# Patient Record
Sex: MALE | Race: WHITE | Employment: OTHER | URBAN - METROPOLITAN AREA
[De-identification: names, ages, dates, MRNs, and addresses within clinical notes are randomized per-mention and may not be internally consistent; named-entity substitution may affect disease eponyms.]

---

## 2017-04-06 ENCOUNTER — OFFICE VISIT (OUTPATIENT)
Dept: ORTHOPEDIC SURGERY | Age: 67
End: 2017-04-06

## 2017-04-06 VITALS
HEIGHT: 71 IN | WEIGHT: 265 LBS | DIASTOLIC BLOOD PRESSURE: 78 MMHG | BODY MASS INDEX: 37.1 KG/M2 | SYSTOLIC BLOOD PRESSURE: 123 MMHG | HEART RATE: 72 BPM

## 2017-04-06 DIAGNOSIS — M12.562 TRAUMATIC ARTHROPATHY OF LEFT KNEE: Primary | ICD-10-CM

## 2017-04-06 DIAGNOSIS — M12.561 TRAUMATIC ARTHROPATHY OF RIGHT KNEE: ICD-10-CM

## 2017-04-06 PROCEDURE — 99213 OFFICE O/P EST LOW 20 MIN: CPT | Performed by: PHYSICIAN ASSISTANT

## 2017-05-18 RX ORDER — NAPROXEN 500 MG/1
TABLET ORAL
Qty: 60 TABLET | Refills: 5 | Status: SHIPPED | OUTPATIENT
Start: 2017-05-18 | End: 2017-11-06 | Stop reason: SDUPTHER

## 2017-06-08 ENCOUNTER — HOSPITAL ENCOUNTER (OUTPATIENT)
Dept: ULTRASOUND IMAGING | Age: 67
Discharge: HOME | End: 2017-06-08
Attending: NURSE PRACTITIONER | Admitting: NURSE PRACTITIONER

## 2017-06-08 DIAGNOSIS — N50.89 TESTICLE SWELLING: ICD-10-CM

## 2017-06-08 DIAGNOSIS — N50.89 OTHER SPECIFIED DISORDERS OF MALE GENITAL ORGANS: ICD-10-CM

## 2017-09-28 ENCOUNTER — OFFICE VISIT (OUTPATIENT)
Dept: ORTHOPEDIC SURGERY | Age: 67
End: 2017-09-28

## 2017-09-28 VITALS
BODY MASS INDEX: 37.1 KG/M2 | DIASTOLIC BLOOD PRESSURE: 79 MMHG | HEART RATE: 73 BPM | WEIGHT: 265 LBS | HEIGHT: 71 IN | SYSTOLIC BLOOD PRESSURE: 122 MMHG | TEMPERATURE: 97.5 F

## 2017-09-28 DIAGNOSIS — M12.561 TRAUMATIC ARTHROPATHY OF RIGHT KNEE: Primary | ICD-10-CM

## 2017-09-28 DIAGNOSIS — M12.562 TRAUMATIC ARTHROPATHY OF LEFT KNEE: ICD-10-CM

## 2017-09-28 PROCEDURE — 99213 OFFICE O/P EST LOW 20 MIN: CPT | Performed by: ORTHOPAEDIC SURGERY

## 2017-11-06 RX ORDER — NAPROXEN 500 MG/1
TABLET ORAL
Qty: 60 TABLET | Refills: 5 | Status: SHIPPED | OUTPATIENT
Start: 2017-11-06 | End: 2018-03-29 | Stop reason: SDUPTHER

## 2018-03-29 ENCOUNTER — OFFICE VISIT (OUTPATIENT)
Dept: ORTHOPEDIC SURGERY | Age: 68
End: 2018-03-29

## 2018-03-29 VITALS
HEIGHT: 71 IN | WEIGHT: 265 LBS | BODY MASS INDEX: 37.1 KG/M2 | HEART RATE: 68 BPM | SYSTOLIC BLOOD PRESSURE: 132 MMHG | TEMPERATURE: 97.6 F | DIASTOLIC BLOOD PRESSURE: 81 MMHG

## 2018-03-29 DIAGNOSIS — M12.561 TRAUMATIC ARTHROPATHY OF RIGHT KNEE: Primary | ICD-10-CM

## 2018-03-29 DIAGNOSIS — M12.562 TRAUMATIC ARTHROPATHY OF LEFT KNEE: ICD-10-CM

## 2018-03-29 PROCEDURE — 99212 OFFICE O/P EST SF 10 MIN: CPT | Performed by: PHYSICIAN ASSISTANT

## 2018-03-29 RX ORDER — CYCLOBENZAPRINE HCL 10 MG
10 TABLET ORAL EVERY 8 HOURS PRN
Qty: 90 TABLET | Refills: 5 | Status: SHIPPED | OUTPATIENT
Start: 2018-03-29 | End: 2019-02-04 | Stop reason: SDUPTHER

## 2018-03-29 RX ORDER — CEPHALEXIN 500 MG/1
CAPSULE ORAL
Qty: 4 CAPSULE | Refills: 3 | Status: SHIPPED | OUTPATIENT
Start: 2018-03-29 | End: 2019-06-20

## 2018-03-29 RX ORDER — NAPROXEN 500 MG/1
TABLET ORAL
Qty: 60 TABLET | Refills: 5 | Status: SHIPPED | OUTPATIENT
Start: 2018-03-29 | End: 2019-06-20

## 2018-03-30 ENCOUNTER — TELEPHONE (OUTPATIENT)
Dept: ORTHOPEDIC SURGERY | Age: 68
End: 2018-03-30

## 2018-04-23 RX ORDER — NAPROXEN 500 MG/1
TABLET ORAL
Qty: 60 TABLET | Refills: 5 | Status: SHIPPED | OUTPATIENT
Start: 2018-04-23 | End: 2018-10-18 | Stop reason: SDUPTHER

## 2018-05-31 ENCOUNTER — TELEPHONE (OUTPATIENT)
Dept: ORTHOPEDIC SURGERY | Age: 68
End: 2018-05-31

## 2018-10-02 ENCOUNTER — OFFICE VISIT (OUTPATIENT)
Dept: ORTHOPEDIC SURGERY | Age: 68
End: 2018-10-02
Payer: COMMERCIAL

## 2018-10-02 VITALS
SYSTOLIC BLOOD PRESSURE: 132 MMHG | HEART RATE: 67 BPM | BODY MASS INDEX: 38.16 KG/M2 | TEMPERATURE: 98 F | WEIGHT: 272.6 LBS | HEIGHT: 71 IN | DIASTOLIC BLOOD PRESSURE: 80 MMHG

## 2018-10-02 DIAGNOSIS — M12.562 TRAUMATIC ARTHROPATHY OF LEFT KNEE: ICD-10-CM

## 2018-10-02 DIAGNOSIS — M12.561 TRAUMATIC ARTHROPATHY OF RIGHT KNEE: Primary | ICD-10-CM

## 2018-10-02 PROCEDURE — 99212 OFFICE O/P EST SF 10 MIN: CPT | Performed by: PHYSICIAN ASSISTANT

## 2018-10-03 NOTE — PROGRESS NOTES
Partners: Female       Current Outpatient Prescriptions   Medication Sig Dispense Refill    naproxen (NAPROSYN) 500 MG tablet TAKE 1 TABLET BY MOUTH TWICE A DAY WITH FOOD 60 tablet 5    cephALEXin (KEFLEX) 500 MG capsule 2 po 2 hours pre procedure and 2 po 2 hours post procedure 4 capsule 3    naproxen (NAPROSYN) 500 MG tablet TAKE 1 TABLET BY MOUTH TWICE A DAY WITH FOOD 60 tablet 5    cyclobenzaprine (FLEXERIL) 10 MG tablet Take 1 tablet by mouth every 8 hours as needed for Muscle spasms 90 tablet 5    metFORMIN (GLUCOPHAGE) 1000 MG tablet Take 1,000 mg by mouth 2 times daily (with meals)      canagliflozin (INVOKANA) 300 MG TABS tablet Take 300 mg by mouth every morning (before breakfast)      ondansetron (ZOFRAN ODT) 4 MG disintegrating tablet Take 1 tablet by mouth every 8 hours as needed for Nausea 20 tablet 0    allopurinol (ZYLOPRIM) 100 MG tablet Take 100 mg by mouth as needed       FLUoxetine (PROZAC) 20 MG capsule Take 10 mg by mouth daily ? Mg dose      levothyroxine (SYNTHROID) 75 MCG tablet       canagliflozin (INVOKANA) 100 MG TABS tablet Take 100 mg by mouth every morning (before breakfast)      apixaban (ELIQUIS) 5 MG TABS tablet Take 1 tablet by mouth 2 times daily 60 tablet 3    eszopiclone (LUNESTA) 3 MG TABS   1    levothyroxine (SYNTHROID) 25 MCG tablet Take 1 tablet by mouth Daily (Patient taking differently: Take 75 mcg by mouth Daily ) 30 tablet 1    metFORMIN (GLUCOPHAGE) 500 MG tablet Take 2 tablets by mouth daily (with breakfast) 60 tablet 2    ALPRAZolam (XANAX) 0.5 MG tablet Take 1 tablet by mouth as needed. 30 tablet 0     No current facility-administered medications for this visit. Objective:     He is alert, oriented x 3, pleasant, well nourished, developed and in no acute distress.     /80   Pulse 67   Temp 98 °F (36.7 °C) (Temporal)   Ht 5' 11\" (1.803 m)   Wt 272 lb 9.6 oz (123.7 kg)   BMI 38.02 kg/m²      KNEE EXAM:  Examination of the bilateral knee shows: There is  no obvious deformity. There is not erythema. There is minimal to no soft tissue swelling. There is no significant joint effusion. AROM-  Extension-     Left 0   Right 0                        Flexion-         Left 115   Right 115  There is no pain associated with ROM testing. Medial joint line is not tender to palpation. Lateral joint line is not tender to palpation. There is not crepitus along the joint line with ROM testing. There is no significant instability with varus or valgus stress testing. Anterior Drawer test is negative for instability. Extensor Mechanism is  intact. X Rays: performed in the office today:   AP, Lateral and Sunrise of the bilateral Knee : There is a bilateral prosthetic total knee arthroplasty present. Right knee arthroplasty is a uncemented arthroplasty. Left knee is a cemented arthroplasty. He may have a little bit of polyethylene wear on the right medial compartment. No ostial lysis or evidence of loosening. Assessment:       ICD-10-CM ICD-9-CM    1. 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 Risks and benefits of the treatment options also reviewed in detail. Continue with a HEP-  A home exercise program was re-instructed today including ROM exercises and strengthening exercises. The patient verbalized understanding of these exercises as well as the importance of the exercise program to promote return of normal function. If pain intensifies or other problems arise you are to notify the office. Prophylactic antibiotics for any surgical or dental procedures.  This is recommended for

## 2018-10-18 RX ORDER — NAPROXEN 500 MG/1
TABLET ORAL
Qty: 60 TABLET | Refills: 5 | Status: SHIPPED | OUTPATIENT
Start: 2018-10-18 | End: 2019-04-17 | Stop reason: SDUPTHER

## 2019-02-04 RX ORDER — CYCLOBENZAPRINE HCL 10 MG
10 TABLET ORAL EVERY 8 HOURS PRN
Qty: 90 TABLET | Refills: 5 | Status: SHIPPED | OUTPATIENT
Start: 2019-02-04 | End: 2019-10-03 | Stop reason: CLARIF

## 2019-04-01 ENCOUNTER — OFFICE VISIT (OUTPATIENT)
Dept: ORTHOPEDIC SURGERY | Age: 69
End: 2019-04-01
Payer: COMMERCIAL

## 2019-04-01 VITALS
SYSTOLIC BLOOD PRESSURE: 149 MMHG | WEIGHT: 280 LBS | BODY MASS INDEX: 39.05 KG/M2 | HEART RATE: 71 BPM | DIASTOLIC BLOOD PRESSURE: 89 MMHG | TEMPERATURE: 98.4 F

## 2019-04-01 DIAGNOSIS — M12.562 TRAUMATIC ARTHROPATHY OF LEFT KNEE: ICD-10-CM

## 2019-04-01 DIAGNOSIS — M12.561 TRAUMATIC ARTHROPATHY OF RIGHT KNEE: Primary | ICD-10-CM

## 2019-04-01 PROCEDURE — 99212 OFFICE O/P EST SF 10 MIN: CPT | Performed by: PHYSICIAN ASSISTANT

## 2019-04-02 NOTE — PROGRESS NOTES
Subjective:      Patient ID: Lacho Cloud is a 79 y.o.  male. Chief Complaint   Patient presents with    Knee Problem     Left TKA     Knee Problem     Right TKA         HPI: He is here for follow-up on his left or right knee. These are [facility] injuries resulting in traumatic arthropathy of the left right knee. Status post bilateral knee arthroplasties. Left knee has 9, right knee 2007. Occasional discomfort times but overall doing well. Currently not working. Review of Systems:   Negative for fever or chills.       Past Medical History:   Diagnosis Date    Acute rhinosinusitis 2013    Anxiety     Arthritis     Chronic back pain     Depression     Diabetes mellitus ([address]     Hernia of unspecified site of abdominal cavity without mention of obstruction or gangrene     Hyperlipidemia     Hypertension     no hx HTN    Hypothyroidism     Kidney stones     Movement disorder     Osteoarthritis     Pneumonia     Type II or unspecified type diabetes mellitus without mention of complication, not stated as uncontrolled     Vitamin D deficiency        Family History   Problem Relation Age of Onset    Cancer Father     Arthritis Other     Cancer Other     Diabetes Other     Heart Disease Other     High Blood Pressure Other        Past Surgical History:   Procedure Laterality Date    CHOLECYSTECTOMY      CYSTOSCOPY  2010    CYSTOSCOPY  11    HERNIA REPAIR      umbilical    JOINT REPLACEMENT      right    JOINT REPLACEMENT      left    LITHOTRIPSY      TOTAL KNEE ARTHROPLASTY      two    URETER STENT PLACEMENT      URETHRAL STRICTURE DILATATION  11       Social History     Occupational History    Occupation: disabled/retired   Tobacco Use    Smoking status: Former Smoker     Packs/day: 1.00     Years: 20.00     Pack years: 20.00     Types: Cigarettes     Last attempt to quit: 2012     Years since quittin.8    Smokeless tobacco: Never Used   [facility] Tobacco comment: quit   Substance and Sexual Activity    Alcohol use: No    Drug use: No    Sexual activity: Yes     Partners: Female       Current Outpatient Medications   Medication Sig Dispense Refill    cyclobenzaprine (FLEXERIL) 10 MG tablet Take 1 tablet by mouth every 8 hours as needed for Muscle spasms 90 tablet 5    naproxen (NAPROSYN) 500 MG tablet TAKE 1 TABLET BY MOUTH TWICE A DAY WITH FOOD 60 tablet 5    cephALEXin (KEFLEX) 500 MG capsule 2 po 2 hours pre procedure and 2 po 2 hours post procedure 4 capsule 3    naproxen (NAPROSYN) 500 MG tablet TAKE 1 TABLET BY MOUTH TWICE A DAY WITH FOOD 60 tablet 5    metFORMIN (GLUCOPHAGE) 1000 MG tablet Take 1,000 mg by mouth 2 times daily (with meals)      canagliflozin (INVOKANA) 300 MG TABS tablet Take 300 mg by mouth every morning (before breakfast)      ondansetron (ZOFRAN ODT) 4 MG disintegrating tablet Take 1 tablet by mouth every 8 hours as needed for Nausea 20 tablet 0    allopurinol (ZYLOPRIM) 100 MG tablet Take 100 mg by mouth as needed       FLUoxetine (PROZAC) 20 MG capsule Take 10 mg by mouth daily ? Mg dose      levothyroxine (SYNTHROID) 75 MCG tablet       canagliflozin (INVOKANA) 100 MG TABS tablet Take 100 mg by mouth every morning (before breakfast)      apixaban (ELIQUIS) 5 MG TABS tablet Take 1 tablet by mouth 2 times daily 60 tablet 3    eszopiclone (LUNESTA) 3 MG TABS   1    levothyroxine (SYNTHROID) 25 MCG tablet Take 1 tablet by mouth Daily (Patient taking differently: Take 75 mcg by mouth Daily ) 30 tablet 1    metFORMIN (GLUCOPHAGE) 500 MG tablet Take 2 tablets by mouth daily (with breakfast) 60 tablet 2    ALPRAZolam (XANAX) 0.5 MG tablet Take 1 tablet by mouth as needed. 30 tablet 0     No current facility-administered medications for this visit. Objective:   He is alert, oriented x 3, pleasant, well nourished, developed and in no acute distress.     BP (!) 149/89   Pulse 71   Temp 98.4 °F (36.9 °C) (Temporal)   Wt 280 lb (127 kg)   BMI 39.05 kg/m²      He has had no significant pain with range of motion testing of the left or right knee. There is no crepitus or significant joint effusion. There is no instability. Left as well as right knee has full extension and about 125° flexion. X Rays: not performed in the office today:     Diagnosis:        ICD-10-CM    1. [facility]: Traumatic arthropathy of right [facility]-1604 M12.561    2. 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 The natural history of the patient's diagnosis as well as the treatment options were discussed in full and questions were answered. Risks and benefits of the treatment options also reviewed in detail. Continue follow-up every 6 months. X-rays will be obtained next visit. Follow Up:   Call or return to clinic prn if these symptoms worsen or fail to improve as anticipated.

## 2019-04-18 ENCOUNTER — OFFICE VISIT (OUTPATIENT)
Dept: ORTHOPEDIC SURGERY | Age: 69
End: 2019-04-18
Payer: MEDICARE

## 2019-04-18 VITALS — HEIGHT: 71 IN | BODY MASS INDEX: 39.2 KG/M2 | RESPIRATION RATE: 16 BRPM | WEIGHT: 280 LBS

## 2019-04-18 DIAGNOSIS — M72.2 BILATERAL PLANTAR FASCIITIS: Primary | ICD-10-CM

## 2019-04-18 PROCEDURE — 99214 OFFICE O/P EST MOD 30 MIN: CPT | Performed by: ORTHOPAEDIC SURGERY

## 2019-04-18 NOTE — PROGRESS NOTES
CHIEF COMPLAINT: Bilateral heel pain/plantar fasciitis. HISTORY:  Mr. Migel Alejandro 79 y.o.  male presents today for the first visit for evaluation of bilateral heel pain which started years ago.  He is complaining of achy  pain. Pain is increase with standing and wallking. Pain is sharp early in the morning with first few steps, dull achy pain by the end of the day. No radiation and no numbness and tingling sensation. No other complaint.       Past Medical History:   Diagnosis Date    Acute rhinosinusitis 11/8/2013    Anxiety     Arthritis     Chronic back pain     Depression     Diabetes mellitus ([facility] [address]     Hernia of unspecified site of abdominal cavity without mention of obstruction or gangrene     Hyperlipidemia     Hypertension     no hx HTN    Hypothyroidism     Kidney stones     Movement disorder     Osteoarthritis     Pneumonia     Type II or unspecified type diabetes mellitus without mention of complication, not stated as uncontrolled     Vitamin D deficiency        Past Surgical History:   Procedure Laterality Date    CHOLECYSTECTOMY      CYSTOSCOPY  04/16/2010    CYSTOSCOPY  03/21/11    HERNIA REPAIR      umbilical    JOINT REPLACEMENT      right    JOINT REPLACEMENT      left    LITHOTRIPSY      TOTAL KNEE ARTHROPLASTY      two    URETER STENT PLACEMENT      URETHRAL STRICTURE DILATATION  03/21/11       Social History     Socioeconomic History    Marital status:      Spouse name: Alma Salcedo Number of children: 3    Years of education: 8    Highest education level: Not on file   Occupational History    Occupation: disabled/retired   Social Needs    Financial resource strain: Not on file    Food insecurity:     Worry: Not on file     Inability: Not on file   [email or web address] needs:     Medical: Not on file     Non-medical: Not on file   Tobacco Use    Smoking status: Former Smoker     Packs/day: 1.00     Years: 20.00     Pack years: 20.00     Types: for Nausea 20 tablet 0    allopurinol (ZYLOPRIM) 100 MG tablet Take 100 mg by mouth as needed       FLUoxetine (PROZAC) 20 MG capsule Take 10 mg by mouth daily ? Mg dose      levothyroxine (SYNTHROID) 75 MCG tablet       canagliflozin (INVOKANA) 100 MG TABS tablet Take 100 mg by mouth every morning (before breakfast)      apixaban (ELIQUIS) 5 MG TABS tablet Take 1 tablet by mouth 2 times daily 60 tablet 3    eszopiclone (LUNESTA) 3 MG TABS   1    levothyroxine (SYNTHROID) 25 MCG tablet Take 1 tablet by mouth Daily (Patient taking differently: Take 75 mcg by mouth Daily ) 30 tablet 1    metFORMIN (GLUCOPHAGE) 500 MG tablet Take 2 tablets by mouth daily (with breakfast) 60 tablet 2    ALPRAZolam (XANAX) 0.5 MG tablet Take 1 tablet by mouth as needed. 30 tablet 0     No current facility-administered medications on file prior to visit. Pertinent items are noted in HPI  Review of systems reviewed from Patient History Form dated on 10/2/2018 and available in the patient's chart under the Media tab. No change. PHYSICAL EXAMINATION:  Mr. Karine Cool is a very pleasant 79 y.o.  male who presents today in no acute distress, awake, alert, and oriented. He is well dressed, nourished and  groomed. Patient with normal affect. Height is  5' 11\" (1.803 m), weight is 280 lb (127 kg), Body mass index is 39.05 kg/m². Resting respiratory rate is 16. Examination of the gait, showed that the patient walks heel-toe with a non-antalgic gait and no limp.  Examination of both ankles showing a good range of motion.  He has dorsiflexion to about 5 degrees bilaterally, which increased with knee flexion.  He has intact sensation and good pedal pulses.  He has good strength in all four planes, including eversion, and has mild tenderness on deep palpation over the medial calcaneal tubercle, compared to the other side.  The ankles are stable to drawer test bilaterally, equally.      IMAGING:Xray's were reviewed.  3 views of the bilateral foot taken in office today, and showed no acute fracture. No other abnormality. IMPRESSION: Bilateral plantar fasciitis. PLAN: I discussed with the patient the treatment options. We recommended stretching exercises of the calf as well as stretching of the plantar fascia which was taught to the patient today. He will take NSAIDS PRN. Use silicone heel pad. F/u in 6 weeks, PT if needed. He understands that this may take up to 6 months for the pain to resolve.         Ernst Vargas MD

## 2019-04-19 ENCOUNTER — TELEPHONE (OUTPATIENT)
Dept: ORTHOPEDIC SURGERY | Age: 69
End: 2019-04-19

## 2019-04-23 RX ORDER — NAPROXEN 500 MG/1
TABLET ORAL
Qty: 60 TABLET | Refills: 5 | Status: SHIPPED | OUTPATIENT
Start: 2019-04-23 | End: 2019-06-20

## 2019-05-28 ENCOUNTER — TELEPHONE (OUTPATIENT)
Dept: ORTHOPEDIC SURGERY | Age: 69
End: 2019-05-28

## 2019-05-28 NOTE — TELEPHONE ENCOUNTER
Patient wife Soraya Jaimie called regarding patient pain. She states that per the Cardiologist patient needs to get control of pain due to blood pressure. Cardiologist does not want patient to take a lot of Naprosyn due to patient heart. She is calling to obtain pain medication for patient. She did mention a possible refill of Gabapentin.     Pharmacy:  [facility]: [phone]    Please call Soraya Etienne:  [phone]

## 2019-05-29 NOTE — TELEPHONE ENCOUNTER
Called and spoke with Dana Ku.   Cailin Trudi will need to come in to discuss pain management options in order to submit a C9 request with [facility]

## 2019-06-04 ENCOUNTER — OFFICE VISIT (OUTPATIENT)
Dept: ORTHOPEDIC SURGERY | Age: 69
End: 2019-06-04
Payer: COMMERCIAL

## 2019-06-04 VITALS
TEMPERATURE: 98 F | SYSTOLIC BLOOD PRESSURE: 123 MMHG | WEIGHT: 280 LBS | HEART RATE: 73 BPM | DIASTOLIC BLOOD PRESSURE: 73 MMHG | HEIGHT: 71 IN | RESPIRATION RATE: 16 BRPM | BODY MASS INDEX: 39.2 KG/M2

## 2019-06-04 DIAGNOSIS — M12.561 TRAUMATIC ARTHROPATHY OF RIGHT KNEE: Primary | ICD-10-CM

## 2019-06-04 DIAGNOSIS — M12.562 TRAUMATIC ARTHROPATHY OF LEFT KNEE: ICD-10-CM

## 2019-06-04 PROCEDURE — 99213 OFFICE O/P EST LOW 20 MIN: CPT | Performed by: PHYSICIAN ASSISTANT

## 2019-06-04 RX ORDER — GABAPENTIN 100 MG/1
100 CAPSULE ORAL 3 TIMES DAILY
Qty: 90 CAPSULE | Refills: 1 | Status: SHIPPED | OUTPATIENT
Start: 2019-06-04 | End: 2019-10-03 | Stop reason: CLARIF

## 2019-06-05 NOTE — PROGRESS NOTES
Subjective:      Patient ID: Suyapa Richey is a 81 y.o.  male. Chief Complaint   Patient presents with    Follow-up     bilateral knees        HPI: He is here for follow-up bilateral knee posttraumatic arthritis status post bilateral knee arthroplasties. He was control in his knee pain with an Naprosyn 1 p.o. q.h.s. He was advised by his cardiologist to discontinue Naprosyn due to his cardiac history. He is here today to discuss options for pain management. He states his symptoms were fairly well controlled a while back while he was on Neurontin. He states he is not sure why that was discontinued. He is here today to discuss what options can be tried for pain management. He is not interested in narcotics at this time. Knee pain, leg pain is a 7-8/10. Knee pain is his primary concern today. Review of Systems:   Negative for fever or chills. Negative for significant numbness and/or tingling in lower extremity.     Past Medical History:   Diagnosis Date    Acute rhinosinusitis 11/8/2013    Anxiety     Arthritis     Chronic back pain     Depression     Diabetes mellitus ([facility] [address]     Hernia of unspecified site of abdominal cavity without mention of obstruction or gangrene     Hyperlipidemia     Hypertension     no hx HTN    Hypothyroidism     Kidney stones     Movement disorder     Osteoarthritis     Pneumonia     Type II or unspecified type diabetes mellitus without mention of complication, not stated as uncontrolled     Vitamin D deficiency        Family History   Problem Relation Age of Onset    Cancer Father     Arthritis Other     Cancer Other     Diabetes Other     Heart Disease Other     High Blood Pressure Other        Past Surgical History:   Procedure Laterality Date    CHOLECYSTECTOMY      CYSTOSCOPY  04/16/2010    CYSTOSCOPY  03/21/11    HERNIA REPAIR      umbilical    JOINT REPLACEMENT      right    JOINT REPLACEMENT      left    LITHOTRIPSY      TOTAL KNEE ARTHROPLASTY      two    URETER STENT PLACEMENT      URETHRAL STRICTURE DILATATION  11       Social History     Occupational History    Occupation: disabled/retired   Tobacco Use    Smoking status: Former Smoker     Packs/day: 1.00     Years: 20.00     Pack years: 20.00     Types: Cigarettes     Last attempt to quit: 2012     Years since quittin.0    Smokeless tobacco: Never Used    Tobacco comment: quit   Substance and Sexual Activity    Alcohol use: No    Drug use: No    Sexual activity: Yes     Partners: Female       Current Outpatient Medications   Medication Sig Dispense Refill    gabapentin (NEURONTIN) 100 MG capsule Take 1 capsule by mouth 3 times daily for 30 days. 90 capsule 1    naproxen (NAPROSYN) 500 MG tablet TAKE 1 TABLET BY MOUTH TWICE A DAY WITH FOOD 60 tablet 5    cyclobenzaprine (FLEXERIL) 10 MG tablet Take 1 tablet by mouth every 8 hours as needed for Muscle spasms 90 tablet 5    cephALEXin (KEFLEX) 500 MG capsule 2 po 2 hours pre procedure and 2 po 2 hours post procedure 4 capsule 3    naproxen (NAPROSYN) 500 MG tablet TAKE 1 TABLET BY MOUTH TWICE A DAY WITH FOOD 60 tablet 5    metFORMIN (GLUCOPHAGE) 1000 MG tablet Take 1,000 mg by mouth 2 times daily (with meals)      canagliflozin (INVOKANA) 300 MG TABS tablet Take 300 mg by mouth every morning (before breakfast)      ondansetron (ZOFRAN ODT) 4 MG disintegrating tablet Take 1 tablet by mouth every 8 hours as needed for Nausea 20 tablet 0    allopurinol (ZYLOPRIM) 100 MG tablet Take 100 mg by mouth as needed       FLUoxetine (PROZAC) 20 MG capsule Take 10 mg by mouth daily ?  Mg dose      levothyroxine (SYNTHROID) 75 MCG tablet       canagliflozin (INVOKANA) 100 MG TABS tablet Take 100 mg by mouth every morning (before breakfast)      apixaban (ELIQUIS) 5 MG TABS tablet Take 1 tablet by mouth 2 times daily 60 tablet 3    eszopiclone (LUNESTA) 3 MG TABS   1    levothyroxine (SYNTHROID) 25 MCG tablet Take 1 tablet by mouth Daily (Patient taking differently: Take 75 mcg by mouth Daily ) 30 tablet 1    metFORMIN (GLUCOPHAGE) 500 MG tablet Take 2 tablets by mouth daily (with breakfast) 60 tablet 2    ALPRAZolam (XANAX) 0.5 MG tablet Take 1 tablet by mouth as needed. 30 tablet 0     No current facility-administered medications for this visit. Objective:   He is alert, oriented x 3, pleasant, well nourished, developed and in no acute distress. /73   Pulse 73   Temp 98 °F (36.7 °C) (Temporal)   Resp 16   Ht 5' 11\" (1.803 m)   Wt 280 lb (127 kg)   BMI 39.05 kg/m²      Examination of both left and right knee demonstrates a well-functioning knee arthroplasties with mild soft tissue swelling and minimal joint fusions. There is no gross instability noted with range of motion testing. No crepitus. Examination of the lower extremities are intact with sensation to light touch. Motor testing  5/5 in all major motor groups of the lower extremities. Gait is normal heel to toe. Gait is antalgic. Negative Gibbs's Sign. SLR negative. Examination of the lower extremities shows intact perfusion to all extremities. No cyanosis. Digits are warm to touch, capillary refill is less than 2 seconds. There is no edema noted. Examination of the skin over both lower extremities reveals: The skin to be intact without lacerations or abrasions. No significant erythema. No rashes or skin lesions. X Rays: not performed in the office today:     Diagnosis:        ICD-10-CM    1. [facility]: Traumatic arthropathy of right [facility]-5124 M12.561    2. 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 These were performed 10 and 12 years ago. The natural history of the patient's diagnosis as well as the treatment options were discussed in full and questions were answered.  Risks and benefits of the treatment options also reviewed in detail. Will try Neurontin 100 mg p.o. t.i.d. for his pain complaints. May need to titrate up over time. Follow Up: 1 month  Call or return to clinic prn if these symptoms worsen or fail to improve as anticipated.

## 2019-06-20 ENCOUNTER — OFFICE VISIT (OUTPATIENT)
Dept: ORTHOPEDIC SURGERY | Age: 69
End: 2019-06-20
Payer: COMMERCIAL

## 2019-06-20 ENCOUNTER — HOSPITAL ENCOUNTER (EMERGENCY)
Age: 69
Discharge: HOME | End: 2019-06-20
Payer: MEDICARE

## 2019-06-20 VITALS
HEIGHT: 71 IN | HEART RATE: 80 BPM | BODY MASS INDEX: 39.76 KG/M2 | RESPIRATION RATE: 16 BRPM | TEMPERATURE: 98.8 F | OXYGEN SATURATION: 98 % | SYSTOLIC BLOOD PRESSURE: 167 MMHG | DIASTOLIC BLOOD PRESSURE: 99 MMHG | WEIGHT: 284 LBS

## 2019-06-20 VITALS
WEIGHT: 280 LBS | HEART RATE: 88 BPM | BODY MASS INDEX: 39.2 KG/M2 | HEIGHT: 71 IN | SYSTOLIC BLOOD PRESSURE: 132 MMHG | DIASTOLIC BLOOD PRESSURE: 80 MMHG

## 2019-06-20 DIAGNOSIS — M79.605 LEFT LEG PAIN: Primary | ICD-10-CM

## 2019-06-20 LAB
A/G RATIO: 1.3 (ref 1.1–2.2)
ALBUMIN SERPL-MCNC: 4.4 G/DL (ref 3.4–5)
ALP BLD-CCNC: 59 U/L (ref 40–129)
ALT SERPL-CCNC: 16 U/L (ref 10–40)
ANION GAP SERPL CALCULATED.3IONS-SCNC: 12 MMOL/L (ref 3–16)
AST SERPL-CCNC: 16 U/L (ref 15–37)
BASOPHILS ABSOLUTE: 0.1 K/UL (ref 0–0.2)
BASOPHILS RELATIVE PERCENT: 1.1 %
BILIRUB SERPL-MCNC: 0.9 MG/DL (ref 0–1)
BUN BLDV-MCNC: 17 MG/DL (ref 7–20)
CALCIUM SERPL-MCNC: 9.9 MG/DL (ref 8.3–10.6)
CHLORIDE BLD-SCNC: 99 MMOL/L (ref 99–110)
CO2: 25 MMOL/L (ref 21–32)
CREAT SERPL-MCNC: <0.5 MG/DL (ref 0.8–1.3)
D DIMER: 226 NG/ML DDU (ref 0–229)
EOSINOPHILS ABSOLUTE: 0.8 K/UL (ref 0–0.6)
EOSINOPHILS RELATIVE PERCENT: 7.9 %
GFR AFRICAN AMERICAN: >60
GFR NON-AFRICAN AMERICAN: >60
GLOBULIN: 3.3 G/DL
GLUCOSE BLD-MCNC: 147 MG/DL (ref 70–99)
HCT VFR BLD CALC: 41.3 % (ref 40.5–52.5)
HEMOGLOBIN: 14 G/DL (ref 13.5–17.5)
INR BLD: 1.1 (ref 0.86–1.14)
LYMPHOCYTES ABSOLUTE: 2.9 K/UL (ref 1–5.1)
LYMPHOCYTES RELATIVE PERCENT: 29.7 %
MCH RBC QN AUTO: 28.9 PG (ref 26–34)
MCHC RBC AUTO-ENTMCNC: 34 G/DL (ref 31–36)
MCV RBC AUTO: 85 FL (ref 80–100)
MONOCYTES ABSOLUTE: 0.7 K/UL (ref 0–1.3)
MONOCYTES RELATIVE PERCENT: 6.8 %
NEUTROPHILS ABSOLUTE: 5.4 K/UL (ref 1.7–7.7)
NEUTROPHILS RELATIVE PERCENT: 54.5 %
PDW BLD-RTO: 14 % (ref 12.4–15.4)
PLATELET # BLD: 262 K/UL (ref 135–450)
PMV BLD AUTO: 8.1 FL (ref 5–10.5)
POTASSIUM SERPL-SCNC: 3.9 MMOL/L (ref 3.5–5.1)
PROTHROMBIN TIME: 12.5 SEC (ref 9.8–13)
RBC # BLD: 4.86 M/UL (ref 4.2–5.9)
SODIUM BLD-SCNC: 136 MMOL/L (ref 136–145)
TOTAL PROTEIN: 7.7 G/DL (ref 6.4–8.2)
WBC # BLD: 9.8 K/UL (ref 4–11)

## 2019-06-20 PROCEDURE — 99212 OFFICE O/P EST SF 10 MIN: CPT | Performed by: PHYSICIAN ASSISTANT

## 2019-06-20 PROCEDURE — 99283 EMERGENCY DEPT VISIT LOW MDM: CPT

## 2019-06-20 PROCEDURE — 85610 PROTHROMBIN TIME: CPT

## 2019-06-20 PROCEDURE — 85379 FIBRIN DEGRADATION QUANT: CPT

## 2019-06-20 PROCEDURE — 80053 COMPREHEN METABOLIC PANEL: CPT

## 2019-06-20 PROCEDURE — 85025 COMPLETE CBC W/AUTO DIFF WBC: CPT

## 2019-06-20 PROCEDURE — 6370000000 HC RX 637 (ALT 250 FOR IP): Performed by: PHYSICIAN ASSISTANT

## 2019-06-20 RX ORDER — TRAMADOL HYDROCHLORIDE 50 MG/1
50 TABLET ORAL ONCE
Status: COMPLETED | OUTPATIENT
Start: 2019-06-20 | End: 2019-06-20

## 2019-06-20 RX ORDER — TRAMADOL HYDROCHLORIDE 50 MG/1
50 TABLET ORAL EVERY 6 HOURS PRN
Qty: 8 TABLET | Refills: 0 | Status: SHIPPED | OUTPATIENT
Start: 2019-06-20 | End: 2019-06-23

## 2019-06-20 RX ADMIN — TRAMADOL HYDROCHLORIDE 50 MG: 50 TABLET, FILM COATED ORAL at 22:08

## 2019-06-20 ASSESSMENT — ENCOUNTER SYMPTOMS
WHEEZING: 0
DIARRHEA: 0
COUGH: 0
RHINORRHEA: 0
NAUSEA: 0
ABDOMINAL PAIN: 0
VOMITING: 0
SHORTNESS OF BREATH: 0

## 2019-06-20 ASSESSMENT — PAIN SCALES - GENERAL: PAINLEVEL_OUTOF10: 10

## 2019-06-20 NOTE — PROGRESS NOTES
Subjective:      Patient ID: Jenna Veras is a 81 y.o. male. Chief Complaint   Patient presents with    Leg Pain     patient noticed a knot on his left thigh yesterday and his left knee pain        HPI:   He is here for an initial evaluation of a new problem. Left medial thigh swelling and pain which started yesterday. Progressively worsening. No history of injury. Does have a remote history of injury, [facility] claim. Resulted in left knee arthroplasty years ago, 2009. Pain Scale 6/10 VAS. Location of pain left groin and medial thigh to medial calf. Pain is worse with activity. Pain improves with elevation. He does have a history of a prior DVT left lower extremity in 2009 postop left knee arthroplasty. He is currently on Eliquis. Review Of Systems:   A 14 point review of systems and history form completed by the patient has been reviewed. This scanned in the media tab of the patient's chart under 10/02/2018 date.      Past Medical History:   Diagnosis Date    Acute rhinosinusitis 11/8/2013    Anxiety     Arthritis     Chronic back pain     Depression     Diabetes mellitus ([facility] [address]     Hernia of unspecified site of abdominal cavity without mention of obstruction or gangrene     Hyperlipidemia     Hypertension     no hx HTN    Hypothyroidism     Kidney stones     Movement disorder     Osteoarthritis     Pneumonia     Type II or unspecified type diabetes mellitus without mention of complication, not stated as uncontrolled     Vitamin D deficiency        Family History   Problem Relation Age of Onset    Cancer Father     Arthritis Other     Cancer Other     Diabetes Other     Heart Disease Other     High Blood Pressure Other        Past Surgical History:   Procedure Laterality Date    CHOLECYSTECTOMY      CYSTOSCOPY  04/16/2010    CYSTOSCOPY  03/21/11    HERNIA REPAIR      umbilical    JOINT REPLACEMENT      right    JOINT REPLACEMENT      left    LITHOTRIPSY      TOTAL KNEE ARTHROPLASTY      two    URETER STENT PLACEMENT      URETHRAL STRICTURE DILATATION  11       Social History     Occupational History    Occupation: disabled/retired   Tobacco Use    Smoking status: Former Smoker     Packs/day: 1.00     Years: 20.00     Pack years: 20.00     Types: Cigarettes     Last attempt to quit: 2012     Years since quittin.0    Smokeless tobacco: Never Used    Tobacco comment: quit   Substance and Sexual Activity    Alcohol use: No    Drug use: No    Sexual activity: Yes     Partners: Female       Current Outpatient Medications   Medication Sig Dispense Refill    gabapentin (NEURONTIN) 100 MG capsule Take 1 capsule by mouth 3 times daily for 30 days. 90 capsule 1    naproxen (NAPROSYN) 500 MG tablet TAKE 1 TABLET BY MOUTH TWICE A DAY WITH FOOD 60 tablet 5    cyclobenzaprine (FLEXERIL) 10 MG tablet Take 1 tablet by mouth every 8 hours as needed for Muscle spasms 90 tablet 5    cephALEXin (KEFLEX) 500 MG capsule 2 po 2 hours pre procedure and 2 po 2 hours post procedure 4 capsule 3    naproxen (NAPROSYN) 500 MG tablet TAKE 1 TABLET BY MOUTH TWICE A DAY WITH FOOD 60 tablet 5    metFORMIN (GLUCOPHAGE) 1000 MG tablet Take 1,000 mg by mouth 2 times daily (with meals)      canagliflozin (INVOKANA) 300 MG TABS tablet Take 300 mg by mouth every morning (before breakfast)      ondansetron (ZOFRAN ODT) 4 MG disintegrating tablet Take 1 tablet by mouth every 8 hours as needed for Nausea 20 tablet 0    allopurinol (ZYLOPRIM) 100 MG tablet Take 100 mg by mouth as needed       FLUoxetine (PROZAC) 20 MG capsule Take 10 mg by mouth daily ?  Mg dose      levothyroxine (SYNTHROID) 75 MCG tablet       canagliflozin (INVOKANA) 100 MG TABS tablet Take 100 mg by mouth every morning (before breakfast)      apixaban (ELIQUIS) 5 MG TABS tablet Take 1 tablet by mouth 2 times daily 60 tablet 3    eszopiclone (LUNESTA) 3 MG TABS   1    levothyroxine (SYNTHROID) 25 MCG tablet Take 1 tablet by mouth Daily (Patient taking differently: Take 75 mcg by mouth Daily ) 30 tablet 1    metFORMIN (GLUCOPHAGE) 500 MG tablet Take 2 tablets by mouth daily (with breakfast) 60 tablet 2    ALPRAZolam (XANAX) 0.5 MG tablet Take 1 tablet by mouth as needed. 30 tablet 0     No current facility-administered medications for this visit. Objective:     He is alert, oriented x 3, pleasant, well nourished, developed and in no   acute distress. /80   Pulse 88   Ht 5' 11\" (1.803 m)   Wt 280 lb (127 kg)   BMI 39.05 kg/m²      Examination of his left lower extremity shows stable knee arthroplasty without joint effusion or significant soft tissue swelling. Examination of the lower extremities are intact with sensation to light touch. Motor testing  5/5 in all major motor groups of the lower extremities. Gait is normal heel to toe. Gait is antalgic. Negative Gibbs's Sign. SLR negative. Examination of the lower extremities shows intact perfusion to all extremities. No cyanosis. Digits are warm to touch, capillary refill is less than 2 seconds. There is moderate edema noted LLE. He does have tenderness over the medial venous structures. Examination of the skin over both lower extremities reveals: The skin to be intact without lacerations or abrasions. No significant erythema. No rashes or skin lesions. X Rays: not performed in the office today:     Additional Tests reviewed: none  Additional Outside Records reviewed: none    Diagnosis:   Left lower extremity pain. Assessment and Plan:       He is having pain in the left lower extremity. This may represent a DVT. The natural history of the patient's diagnosis as well as the treatment options were discussed in full and questions were answered. Risks and benefits of the treatment options also reviewed in detail. He was instructed to go to [facility] ER for evaluation and DVT protocol.       Follow Up: Call or return to clinic prn if these symptoms worsen or fail to improve as anticipated.

## 2019-06-21 ENCOUNTER — HOSPITAL ENCOUNTER (OUTPATIENT)
Dept: VASCULAR LAB | Age: 69
Discharge: HOME | End: 2019-06-21
Payer: MEDICARE

## 2019-06-21 DIAGNOSIS — M79.605 LEFT LEG PAIN: ICD-10-CM

## 2019-06-21 PROCEDURE — 93971 EXTREMITY STUDY: CPT

## 2019-06-21 NOTE — ED NOTES
Pt a/ox4, c/o intermittent hip pain that radiates to knee. Pt also c/o burning to left inner thigh and groin at time. Last night, pt reported night sweats and urinary frequency. Pt is worried about blood clot and was seen earlier at urgent care. Heart and lung sounds WNL. Leg and hip appear normal, no redness noted, no warmth at this time. Cap refill <2sec. While writer in room, pt began c/o headache as well and chronic shoulder pain.        Behzad Guillen RN  06/20/19 2039

## 2019-06-21 NOTE — ED PROVIDER NOTES
[facility]        Pt Name: Lacho Cloud  MRN: [id number]  [place] 1950  Date of evaluation: 6/20/2019  Provider: Rosaline Oviedo PA-C  PCP: RAJANI Snow CNP    This patient was not seen and evaluated by the attending physician No att. providers found. CHIEF COMPLAINT       Chief Complaint   Patient presents with    Leg Pain     was orthro walk in  clinic at [facility] for left leg pain into groin. there is a knot and warm to touch around knee. worried he has a blood clot. c/o headache.  s/s started a few weeks ago and got worse last night. HISTORY OF PRESENT ILLNESS   (Location/Symptom, Timing/Onset, Context/Setting, Quality, Duration, Modifying Factors, Severity)  Note limiting factors. Lacho Cloud is a 81 y.o. male who presents for evaluation of left leg pain last night. Patient states that he was unable to get comfortable. He reported pain in his knee and calf. Wife states that she felt a knot in his left upper thigh today that \"looked like a blue thumb. \"  It has since resolved. Saw his orthopedic surgeon today who told him that the leg seems swollen and with calf pain he is concerned for blood clot, sent him here for further evaluation and management. Patient has no history of this. No recent travel, hospitalizations or operations. No hormones or smoking. He is anticoagulated on Eliquis. He denies chest pain or shortness of breath. No dizziness/lightheadedness, weakness, visual disturbance or syncope. No numbness, tingling or weakness distally. No other complaints or concerns at this time. Nursing Notes were all reviewed and agreed with or any disagreements were addressed  in the HPI. REVIEW OF SYSTEMS    (2-9 systems for level 4, 10 or more for level 5)     Review of Systems   Constitutional: Negative for appetite change, chills and fever. HENT: Negative for congestion and rhinorrhea. Respiratory: Negative for cough, shortness of breath and wheezing. Cardiovascular: Negative for chest pain. Gastrointestinal: Negative for abdominal pain, diarrhea, nausea and vomiting. Genitourinary: Negative for difficulty urinating, dysuria and hematuria. Musculoskeletal: Negative for neck pain and neck stiffness. L leg pain   Skin: Negative for rash. Neurological: Negative for weakness, numbness and headaches. Positives and Pertinent negatives as per HPI. Except as noted abovein the ROS, all other systems were reviewed and negative. PAST MEDICAL HISTORY     Past Medical History:   Diagnosis Date    Acute rhinosinusitis 11/8/2013    Anxiety     Arthritis     Chronic back pain     Depression     Diabetes mellitus ([facility] [address]     Hernia of unspecified site of abdominal cavity without mention of obstruction or gangrene     Hyperlipidemia     Hypertension     no hx HTN    Hypothyroidism     Kidney stones     Movement disorder     Osteoarthritis     Pneumonia     Type II or unspecified type diabetes mellitus without mention of complication, not stated as uncontrolled     Vitamin D deficiency          SURGICAL HISTORY     Past Surgical History:   Procedure Laterality Date    CHOLECYSTECTOMY      CYSTOSCOPY  04/16/2010    CYSTOSCOPY  03/21/11    HERNIA REPAIR      umbilical    JOINT REPLACEMENT      right    JOINT REPLACEMENT      left    LITHOTRIPSY      TOTAL KNEE ARTHROPLASTY      two    URETER STENT PLACEMENT      URETHRAL STRICTURE DILATATION  03/21/11         CURRENTMEDICATIONS       Previous Medications    APIXABAN (ELIQUIS) 5 MG TABS TABLET    Take 1 tablet by mouth 2 times daily    CYCLOBENZAPRINE (FLEXERIL) 10 MG TABLET    Take 1 tablet by mouth every 8 hours as needed for Muscle spasms    ESZOPICLONE (LUNESTA) 3 MG TABS        FLUOXETINE (PROZAC) 20 MG CAPSULE    Take 10 mg by mouth daily ?  Mg dose    GABAPENTIN (NEURONTIN) 100 MG CAPSULE    Take 1 capsule by mouth 3 times daily for 30 days.     INSULIN NPH ISOPHANE & REGULAR (NOVOLIN 70/30 FLEXPEN RELION SC)    Inject 34 Units into the skin 2 times daily    LEVOTHYROXINE (SYNTHROID) 25 MCG TABLET    Take 1 tablet by mouth Daily    METFORMIN (GLUCOPHAGE) 1000 MG TABLET    Take 1,000 mg by mouth 2 times daily (with meals)         ALLERGIES     Statins and Vicodin [hydrocodone-acetaminophen]    FAMILYHISTORY       Family History   Problem Relation Age of Onset    Cancer Father     Arthritis Other     Cancer Other     Diabetes Other     Heart Disease Other     High Blood Pressure Other           SOCIAL HISTORY       Social History     Socioeconomic History    Marital status:      Spouse name: Jerilyn Levin Number of children: 3    Years of education: 10    Highest education level: None   Occupational History    Occupation: disabled/retired   Social Needs    Financial resource strain: None    Food insecurity:     Worry: None     Inability: None    Transportation needs:     Medical: None     Non-medical: None   Tobacco Use    Smoking status: Former Smoker     Packs/day: 1.00     Years: 20.00     Pack years: 20.00     Types: Cigarettes     Last attempt to quit: 2012     Years since quittin.0    Smokeless tobacco: Never Used    Tobacco comment: quit   Substance and Sexual Activity    Alcohol use: No    Drug use: No    Sexual activity: Yes     Partners: Female   Lifestyle    Physical activity:     Days per week: None     Minutes per session: None    Stress: None   Relationships    Social connections:     Talks on phone: None     Gets together: None     Attends Gnosticism service: None     Active member of club or organization: None     Attends meetings of clubs or organizations: None     Relationship status: None    Intimate partner violence:     Fear of current or ex partner: None     Emotionally abused: None     Physically abused: None     Forced sexual activity: None   Other Topics Concern  None   Social History Narrative    None       SCREENINGS             PHYSICAL EXAM    (up to 7 for level 4, 8 or more for level 5)     ED Triage Vitals [06/20/19 1951]   BP Temp Temp Source Pulse Resp SpO2 Height Weight   (!) 150/90 98.8 °F (37.1 °C) Infrared 82 18 94 % 5' 11\" (1.803 m) 284 lb (128.8 kg)       Physical Exam   Constitutional: He is oriented to person, place, and time. He appears well-developed and well-nourished. HENT:   Head: Normocephalic and atraumatic. Right Ear: External ear normal.   Left Ear: External ear normal.   Nose: Nose normal.   Eyes: Right eye exhibits no discharge. Left eye exhibits no discharge. Neck: Normal range of motion. Neck supple. Cardiovascular: Normal rate, regular rhythm and normal heart sounds. Pulmonary/Chest: Effort normal and breath sounds normal. No respiratory distress. Abdominal: Soft. Musculoskeletal: Normal range of motion. Left knee: He exhibits normal range of motion. No tenderness found. Left lower leg: He exhibits no tenderness, no bony tenderness and no swelling. Neurological: He is alert and oriented to person, place, and time. He has normal strength. No sensory deficit. Skin: Skin is warm and dry. He is not diaphoretic. Psychiatric: He has a normal mood and affect. His behavior is normal.   Nursing note and vitals reviewed.       DIAGNOSTIC RESULTS   LABS:    Labs Reviewed   CBC WITH AUTO DIFFERENTIAL - Abnormal; Notable for the following components:       Result Value    Eosinophils # 0.8 (*)     All other components within normal limits    Narrative:     Performed at:  [facility] [address], [place], [facility] [address]   Phone [phone]   COMPREHENSIVE METABOLIC PANEL - Abnormal; Notable for the following components:    Glucose 147 (*)     CREATININE <0.5 (*)     All other components within normal limits    Narrative:     Performed at:  [facility]  [address], [address]   Phone [phone]   D-DIMER, QUANTITATIVE    Narrative:     Performed at:  [facility]  [address], [address]   Phone [phone]   PROTIME-INR    Narrative:     Performed at:  [facility]  [address], [address]   Phone [phone]       All other labs were within normal range or not returned as of this dictation. EKG: All EKG's are interpreted by the Emergency Department Physician who either signs orCo-signs this chart in the absence of a cardiologist.  Please see their note for interpretation of EKG. RADIOLOGY:   Non-plain film images such as CT, Ultrasound and MRI are read by the radiologist. Lilibeth Mendieta radiographic images are visualized andpreliminarily interpreted by the  ED Provider with the below findings:        Interpretation perthe Radiologist below, if available at the time of this note:    VL Extremity Venous Left    (Results Pending)     No results found. PROCEDURES   Unless otherwise noted below, none     Procedures    CRITICAL CARE TIME   N/A    CONSULTS:  None      EMERGENCY DEPARTMENT COURSE and DIFFERENTIALDIAGNOSIS/MDM:   Vitals:    Vitals:    06/20/19 1951 06/20/19 2100 06/20/19 2130   BP: (!) 150/90 (!) 152/90 (!) 167/99   Pulse: 82 87 80   Resp: 18 16 16   Temp: 98.8 °F (37.1 °C)     TempSrc: Infrared     SpO2: 94% 95% 98%   Weight: 284 lb (128.8 kg)     Height: 5' 11\" (1.803 m)         Patient was given thefollowing medications:  Medications   traMADol (ULTRAM) tablet 50 mg (50 mg Oral Given 6/20/19 2208)       Patient presents for evaluation of left leg pain yesterday. On exam, he is resting comfortably in bed in no acute distress and nontoxic. He is hypertensive but vitals otherwise stable and he is afebrile. Lungs clear to auscultation bilaterally, chest is nontender and abdomen is benign.   Patient had a subjective pain to his left knee and calf yesterday/last night that has since resolved. He has no focal reducible tenderness on exam.  No asymmetric edema. Range of motion is intact and he is neurovascularly intact distally. He was given tramadol for symptomatic relief and CBC and CMP are unremarkable. D-dimer is negative. Coags are within normal limits. He was sent home with prescription for tramadol and symptomatic and supportive care was discussed including rest, ice, compression and elevation. 10:11 PM Billie Resendiz is unlikely to have a DVT as the following are absent:  estrogen replacement therapy, history of deep venous thrombosis, history of pulmonary embolus, history of malignancy, long duration of automobile or plane travel, recent limb injury or fracture and recent surgery. As its after hours and elective ultrasound is not available, I will discharge home for an outpatient venous duplex doppler in the morning. I have provided Billie Resendiz a prescription, appointment, and instructions. The prescription requests that the radiologist call the ED with the results. He is already anticoagulated on Eliquis. FINAL IMPRESSION      1. Left leg pain          DISPOSITION/PLAN   DISPOSITION Decision To Discharge 06/20/2019 09:38:28 PM      PATIENT REFERREDTO:  Chandni Dunn, APRN - CNP  [address]  [phone]    Call   For a re-check in  2-3  days    [facility] Emergency Department  [address]  [facility] [place]  [phone]  Go to   If symptoms worsen      DISCHARGE MEDICATIONS:  New Prescriptions    TRAMADOL (ULTRAM) 50 MG TABLET    Take 1 tablet by mouth every 6 hours as needed for Pain for up to 3 days. DISCONTINUED MEDICATIONS:  Discontinued Medications    ALLOPURINOL (ZYLOPRIM) 100 MG TABLET    Take 100 mg by mouth as needed     ALPRAZOLAM (XANAX) 0.5 MG TABLET    Take 1 tablet by mouth as needed.     CANAGLIFLOZIN (INVOKANA) 100 MG TABS TABLET    Take 100 mg by mouth every morning (before breakfast)    CANAGLIFLOZIN (INVOKANA) 300 MG TABS TABLET    Take 300 mg by mouth every morning (before breakfast)    CEPHALEXIN (KEFLEX) 500 MG CAPSULE    2 po 2 hours pre procedure and 2 po 2 hours post procedure    LEVOTHYROXINE (SYNTHROID) 75 MCG TABLET        METFORMIN (GLUCOPHAGE) 500 MG TABLET    Take 2 tablets by mouth daily (with breakfast)    NAPROXEN (NAPROSYN) 500 MG TABLET    TAKE 1 TABLET BY MOUTH TWICE A DAY WITH FOOD    NAPROXEN (NAPROSYN) 500 MG TABLET    TAKE 1 TABLET BY MOUTH TWICE A DAY WITH FOOD    ONDANSETRON (ZOFRAN ODT) 4 MG DISINTEGRATING TABLET    Take 1 tablet by mouth every 8 hours as needed for Nausea              (Please note that portions ofthis note were completed with a voice recognition program.  Efforts were made to edit the dictations but occasionally words are mis-transcribed.)    Dyana Richard PA-C (electronically signed)            Sondra Alexander PA-C  06/20/19 8659

## 2019-10-03 ENCOUNTER — OFFICE VISIT (OUTPATIENT)
Dept: ORTHOPEDIC SURGERY | Age: 69
End: 2019-10-03
Payer: COMMERCIAL

## 2019-10-03 VITALS
BODY MASS INDEX: 41.24 KG/M2 | WEIGHT: 294.6 LBS | DIASTOLIC BLOOD PRESSURE: 79 MMHG | HEART RATE: 82 BPM | TEMPERATURE: 96.9 F | HEIGHT: 71 IN | SYSTOLIC BLOOD PRESSURE: 134 MMHG

## 2019-10-03 DIAGNOSIS — M12.562 TRAUMATIC ARTHROPATHY OF LEFT KNEE: ICD-10-CM

## 2019-10-03 DIAGNOSIS — M12.561 TRAUMATIC ARTHROPATHY OF RIGHT KNEE: Primary | ICD-10-CM

## 2019-10-03 PROCEDURE — 99212 OFFICE O/P EST SF 10 MIN: CPT | Performed by: PHYSICIAN ASSISTANT

## 2019-10-03 RX ORDER — MAGNESIUM OXIDE 400 MG/1
400 TABLET ORAL DAILY
COMMUNITY

## 2019-10-03 RX ORDER — POTASSIUM CHLORIDE 600 MG/1
1 TABLET, FILM COATED, EXTENDED RELEASE ORAL
COMMUNITY

## 2019-10-03 RX ORDER — LANOLIN ALCOHOL/MO/W.PET/CERES
1000 CREAM (GRAM) TOPICAL DAILY
COMMUNITY

## 2019-10-03 RX ORDER — EZETIMIBE 10 MG/1
TABLET ORAL
COMMUNITY
Start: 2019-09-09

## 2019-10-03 RX ORDER — FOLIC ACID/MULTIVIT,IRON,MINER 0.4MG-18MG
TABLET ORAL
COMMUNITY

## 2020-04-16 ENCOUNTER — TELEPHONE (OUTPATIENT)
Dept: ORTHOPEDIC SURGERY | Age: 70
End: 2020-04-16

## 2020-04-16 NOTE — TELEPHONE ENCOUNTER
Patient's wife called she has concerns about  coming to appointment on [id number] @ 1:30pm.   Requesting to do a VV if possible. Please call back to discuss.   # [phone]

## 2020-04-27 ENCOUNTER — VIRTUAL VISIT (OUTPATIENT)
Dept: ORTHOPEDIC SURGERY | Age: 70
End: 2020-04-27
Payer: COMMERCIAL

## 2020-04-27 PROCEDURE — 99213 OFFICE O/P EST LOW 20 MIN: CPT | Performed by: PHYSICIAN ASSISTANT

## 2020-04-28 NOTE — PROGRESS NOTES
Alcohol use: No    Drug use: No        Allergies   Allergen Reactions    Statins Other (See Comments)     Muscle cramps      Tramadol      psychosis    Vicodin [Hydrocodone-Acetaminophen]      Itch and dizzy   ,   Past Medical History:   Diagnosis Date    Acute rhinosinusitis 2013    Anxiety     Arthritis     Chronic back pain     Depression     Diabetes mellitus ([facility] [address]     Hernia of unspecified site of abdominal cavity without mention of obstruction or gangrene     Hyperlipidemia     Hypertension     no hx HTN    Hypothyroidism     Kidney stones     Movement disorder     Osteoarthritis     Pneumonia     Type II or unspecified type diabetes mellitus without mention of complication, not stated as uncontrolled     Vitamin D deficiency    ,   Past Surgical History:   Procedure Laterality Date    CHOLECYSTECTOMY      CYSTOSCOPY  2010    CYSTOSCOPY  11    HERNIA REPAIR      umbilical    JOINT REPLACEMENT      right    JOINT REPLACEMENT      left    LITHOTRIPSY      TOTAL KNEE ARTHROPLASTY      two    URETER STENT PLACEMENT      URETHRAL STRICTURE DILATATION  11   ,   Social History     Tobacco Use    Smoking status: Former Smoker     Packs/day: 1.00     Years: 20.00     Pack years: 20.00     Types: Cigarettes     Last attempt to quit: 2012     Years since quittin.9    Smokeless tobacco: Never Used    Tobacco comment: quit   Substance Use Topics    Alcohol use: No    Drug use: No   ,   Family History   Problem Relation Age of Onset    Cancer Father     Arthritis Other     Cancer Other     Diabetes Other     Heart Disease Other     High Blood Pressure Other        PHYSICAL EXAMINATION:  [ INSTRUCTIONS:  \"[x]\" Indicates a positive item  \"[]\" Indicates a negative item  -- DELETE ALL ITEMS NOT EXAMINED]  Vital Signs: (As obtained by patient/caregiver or practitioner observation)    Blood pressure-  Heart rate-    Respiratory rate-    Temperature-  Pulse oximetry-     Constitutional: [x] Appears well-developed and well-nourished [] No apparent distress      [] Abnormal-   Mental status  [x] Alert and awake  [] Oriented to person/place/time []Able to follow commands      Eyes:  EOM    []  Normal  [] Abnormal-  Sclera  []  Normal  [] Abnormal -         Discharge [x]  None visible  [] Abnormal -    HENT:   [x] Normocephalic, atraumatic. [] Abnormal   [] Mouth/Throat: Mucous membranes are moist.     External Ears [x] Normal  [] Abnormal-     Neck: [x] No visualized mass     Pulmonary/Chest: [] Respiratory effort normal.  [x] No visualized signs of difficulty breathing or respiratory distress        [] Abnormal-      Musculoskeletal:   [x] Normal gait with no signs of ataxia         [] Normal range of motion of neck        [] Abnormal-       Neurological:        [] No Facial Asymmetry (Cranial nerve 7 motor function) (limited exam to video visit)          [] No gaze palsy        [] Abnormal-         Skin:        [] No significant exanthematous lesions or discoloration noted on facial skin         [] Abnormal-            Psychiatric:       [] Normal Affect [] No Hallucinations        [] Abnormal-     Other pertinent observable physical exam findings-   Patient demonstrates good range of motion of both left and right knee. Patient is able to ambulate without significant antalgic gait. ASSESSMENT/PLAN:  1. 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 Kae Mederos is a 81 y.o. male being evaluated by a Virtual Visit (video visit) encounter to address concerns as mentioned above. A caregiver was present when appropriate.  Due to this being a TeleHealth encounter (During [facility]-09 public health emergency), evaluation of the following organ systems was limited: Vitals/Constitutional/EENT/Resp/CV/GI//MS/Neuro/Skin/Heme-Lymph-Imm. Pursuant to the emergency declaration under the [address] [place] [address] and the [employer] Act, this Virtual Visit was conducted with patient's (and/or legal guardian's) consent, to reduce the patient's risk of exposure to COVID-19 and provide necessary medical care. The patient (and/or legal guardian) has also been advised to contact this office for worsening conditions or problems, and seek emergency medical treatment and/or call 911 if deemed necessary. Patient identification was verified at the start of the visit: Yes    Total time spent on this encounter: 15 minutes    Services were provided through a video synchronous discussion virtually to substitute for in-person clinic visit. Patient and provider were located at their individual homes. --ROE Boggs on 4/28/2020 at 9:03 AM    An electronic signature was used to authenticate this note.

## 2020-10-27 ENCOUNTER — OFFICE VISIT (OUTPATIENT)
Dept: ORTHOPEDIC SURGERY | Age: 70
End: 2020-10-27
Payer: COMMERCIAL

## 2020-10-27 VITALS — TEMPERATURE: 97.2 F

## 2020-10-27 PROCEDURE — 99213 OFFICE O/P EST LOW 20 MIN: CPT | Performed by: PHYSICIAN ASSISTANT

## 2020-10-27 NOTE — PROGRESS NOTES
Subjective:      Patient ID: Doug Barney is a 78 y.o. male. Chief Complaint   Patient presents with    Knee Problem     Left TKA 2009    Knee Problem     Right TKA 2007        HPI: He is here for annual follow up on bilateral knee arthroplasty. The date of procedure(s) Left TKA 2009. Right TKA 2007. No new complaints or issues. There is minimal to no discomfort with ambulation. There is minimal to no discomfort at rest.   Steps can be performed in reciprocal fashion. Review of Systems:   A full list of the ROS have been reviewed. These are recorded and signed in the chart.     Past Medical History:   Diagnosis Date    Acute rhinosinusitis 11/8/2013    Anxiety     Arthritis     Chronic back pain     Depression     Diabetes mellitus ([facility] [address]     Hernia of unspecified site of abdominal cavity without mention of obstruction or gangrene     Hyperlipidemia     Hypertension     no hx HTN    Hypothyroidism     Kidney stones     Movement disorder     Osteoarthritis     Pneumonia     Type II or unspecified type diabetes mellitus without mention of complication, not stated as uncontrolled     Vitamin D deficiency        Family History   Problem Relation Age of Onset    Cancer Father     Arthritis Other     Cancer Other     Diabetes Other     Heart Disease Other     High Blood Pressure Other        Past Surgical History:   Procedure Laterality Date    CHOLECYSTECTOMY      CYSTOSCOPY  04/16/2010    CYSTOSCOPY  03/21/11    HERNIA REPAIR      umbilical    JOINT REPLACEMENT      right    JOINT REPLACEMENT      left    LITHOTRIPSY      TOTAL KNEE ARTHROPLASTY      two    URETER STENT PLACEMENT      URETHRAL STRICTURE DILATATION  03/21/11       Social History     Occupational History    Occupation: disabled/retired   Tobacco Use    Smoking status: Former Smoker     Packs/day: 1.00     Years: 20.00     Pack years: 20.00     Types: Cigarettes     Last attempt to quit: 6/1/2012     Years since quittin.4    Smokeless tobacco: Never Used    Tobacco comment: quit   Substance and Sexual Activity    Alcohol use: No    Drug use: No    Sexual activity: Yes     Partners: Female       Current Outpatient Medications   Medication Sig Dispense Refill    metoprolol tartrate (LOPRESSOR) 25 MG tablet       aspirin 81 MG tablet Take 81 mg by mouth daily      ezetimibe (ZETIA) 10 MG tablet       potassium chloride (KLOR-CON) 8 MEQ extended release tablet Take 1 tablet by mouth      magnesium oxide (MAG-OX) 400 MG tablet Take 400 mg by mouth daily      vitamin B-12 (CYANOCOBALAMIN) 1000 MCG tablet Take 1,000 mcg by mouth daily      Cholecalciferol (D3 ADULT PO) Take by mouth      Krill Oil 350 MG CAPS Take by mouth      Insulin NPH Isophane & Regular (NOVOLIN 70/30 FLEXPEN RELION SC) Inject 34 Units into the skin 2 times daily      metFORMIN (GLUCOPHAGE) 1000 MG tablet Take 1,000 mg by mouth 2 times daily (with meals)      levothyroxine (SYNTHROID) 25 MCG tablet Take 1 tablet by mouth Daily (Patient taking differently: Take 88 mcg by mouth Daily ) 30 tablet 1     No current facility-administered medications for this visit. Objective:     He is alert, oriented x 3, pleasant, well nourished, developed and in no acute distress. Temp 97.2 °F (36.2 °C) (Temporal)      KNEE EXAM:  Examination of the bilateral knee shows: There is  no obvious deformity. There is not erythema. There is minimal to no soft tissue swelling. There is no significant joint effusion. AROM-  Extension-     Left 0   Right 0                        Flexion-         Left 120   Right 120  There is no pain associated with ROM testing. Medial joint line is not tender to palpation. Lateral joint line is not tender to palpation. There is not crepitus along the joint line with ROM testing. There is no significant instability with varus or valgus stress testing.    Anterior Drawer test is negative for instability. Extensor Mechanism is  intact. NEUROLOGICAL EXAM:  Examination of the lower extremities are intact with sensation to light touch, motor testing 4+ to 5/5 in all major motor groups including hip abductors, hip adductors, Quadriceps, hamstring, dorsi flexors and EHL testing. Normal heel to toe gait. VASCULAR EXAM:  Examination of the upper and lower extremities shows intact perfusion to all extremities, no cyanosis, digits are warm to touch, capillary refill is less than 2 seconds. No significant edema noted. SKIN:  Examination of the skin reveals the skin to be intact without lacerations, abrasions, significant erythema, rashes or skin lesions. X Rays: performed in the office today:   AP, Lateral and Sunrise of the bilateral Knees: There is a bilateral cemented total knee arthroplasty present. The alignment is satisfactory. There are no signs of significant failure or loosening. Assessment:       ICD-10-CM    1. 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 BWC: Traumatic arthropathy of right knee-2007  M12.561 XR KNEE BILATERAL STANDING     XR KNEE RIGHT (1-2 VIEWS)        Plan:       [facility] claim involving left and right knee arthroplasties. Doing well. Stable radiographically and clinically. The natural history of the patient's diagnosis as well as the treatment options were discussed in full and questions were answered. Risks and benefits of the treatment options also reviewed in detail. Continue with a HEP-  A home exercise program was re-instructed today including ROM exercises and strengthening exercises. The patient verbalized understanding of these exercises as well as the importance of the exercise program to promote return of normal function. If pain intensifies or other problems arise you are to notify the office. Prophylactic antibiotics for any surgical or dental procedures.  This is recommended for lifetime. Follow up 6 months for clinical exam and yearly with x ray and clinical evaluations. Call or return to clinic prn if these symptoms worsen or fail to improve as anticipated.

## 2021-03-01 ENCOUNTER — IMMUNIZATION (OUTPATIENT)
Dept: PRIMARY CARE CLINIC | Age: 71
End: 2021-03-01
Payer: MEDICARE

## 2021-03-01 PROCEDURE — 0011A COVID-19, MODERNA VACCINE 100MCG/0.5ML DOSE: CPT | Performed by: FAMILY MEDICINE

## 2021-03-01 PROCEDURE — 91301 COVID-19, MODERNA VACCINE 100MCG/0.5ML DOSE: CPT | Performed by: FAMILY MEDICINE

## 2021-03-29 ENCOUNTER — IMMUNIZATION (OUTPATIENT)
Dept: PRIMARY CARE CLINIC | Age: 71
End: 2021-03-29
Payer: MEDICARE

## 2021-03-29 PROCEDURE — 0012A COVID-19, MODERNA VACCINE 100MCG/0.5ML DOSE: CPT | Performed by: FAMILY MEDICINE

## 2021-03-29 PROCEDURE — 91301 COVID-19, MODERNA VACCINE 100MCG/0.5ML DOSE: CPT | Performed by: FAMILY MEDICINE

## 2021-04-27 ENCOUNTER — OFFICE VISIT (OUTPATIENT)
Dept: ORTHOPEDIC SURGERY | Age: 71
End: 2021-04-27
Payer: COMMERCIAL

## 2021-04-27 VITALS
HEART RATE: 71 BPM | BODY MASS INDEX: 40.32 KG/M2 | DIASTOLIC BLOOD PRESSURE: 71 MMHG | HEIGHT: 71 IN | WEIGHT: 288 LBS | SYSTOLIC BLOOD PRESSURE: 128 MMHG

## 2021-04-27 DIAGNOSIS — M12.562 TRAUMATIC ARTHROPATHY OF LEFT KNEE: Primary | ICD-10-CM

## 2021-04-27 DIAGNOSIS — M12.561 TRAUMATIC ARTHROPATHY OF RIGHT KNEE: ICD-10-CM

## 2021-04-27 PROCEDURE — 99213 OFFICE O/P EST LOW 20 MIN: CPT | Performed by: PHYSICIAN ASSISTANT

## 2021-04-27 NOTE — PROGRESS NOTES
Subjective:      Patient ID: Montserrat Morales is a 78 y.o.  male. Chief Complaint   Patient presents with    Knee Problem     Right TKA   Left TKA         HPI:  Here for annual follow up visit. 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 [facility] Claim. The date of procedure- left TKA , right TKA . Surgeon: Alexis Ma   Issues or complaints: none      Review of Systems:   Negative for fever or chills.        Past Medical History:   Diagnosis Date    Acute rhinosinusitis 2013    Anxiety     Arthritis     Chronic back pain     Depression     Diabetes mellitus ([facility] [address]     Hernia of unspecified site of abdominal cavity without mention of obstruction or gangrene     Hyperlipidemia     Hypertension     no hx HTN    Hypothyroidism     Kidney stones     Movement disorder     Osteoarthritis     Pneumonia     Type II or unspecified type diabetes mellitus without mention of complication, not stated as uncontrolled     Vitamin D deficiency        Family History   Problem Relation Age of Onset    Cancer Father     Arthritis Other     Cancer Other     Diabetes Other     Heart Disease Other     High Blood Pressure Other        Past Surgical History:   Procedure Laterality Date    CHOLECYSTECTOMY      CYSTOSCOPY  2010    CYSTOSCOPY  11    HERNIA REPAIR      umbilical    JOINT REPLACEMENT      right    JOINT REPLACEMENT      left    LITHOTRIPSY      TOTAL KNEE ARTHROPLASTY      two    URETER STENT PLACEMENT      URETHRAL STRICTURE DILATATION  11       Social History     Occupational History    Occupation: disabled/retired   Tobacco Use    Smoking status: Former Smoker     Packs/day: 1.00     Years: 20.00     Pack years: 20.00     Types: Cigarettes     Quit date: 2012     Years since quittin.9    Smokeless tobacco: Never Used    Tobacco comment: quit   Substance and Sexual Activity    Alcohol use: No    Drug use: No    Sexual instability with anterior drawer testing. Extensor Mechanism is intact. X Rays: was not performed in the office today:        Diagnosis:        ICD-10-CM    1. 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 BWC: Traumatic arthropathy of right [id number]  M12.561           Assessment/ Plan:     Assessment:    Clinically and radiographically stable left and right knee arthroplasty 12 and 14 years post op  years post op. Plan:  1. Medications-discussed need for prophylactic antibiotics for dental or surgical procedures. 2. PT- A home exercise program was reviewed today including ROM exercises and strengthening exercises. The patient verbalized understanding of these exercises as well as the importance of the exercise program to promote normal function. 3. Activity restrictions discussed. Avoid running and jumping activities. 4. Further Imaging- 6 months. 5. Procedures- none. 6. Follow up- 6 months. 7. Call or return to clinic if these symptoms worsen or fail to improve as anticipated.

## 2021-11-02 ENCOUNTER — OFFICE VISIT (OUTPATIENT)
Dept: ORTHOPEDIC SURGERY | Age: 71
End: 2021-11-02

## 2021-11-02 VITALS — RESPIRATION RATE: 18 BRPM | BODY MASS INDEX: 40.32 KG/M2 | HEIGHT: 71 IN | WEIGHT: 288 LBS

## 2021-11-02 DIAGNOSIS — M12.562 TRAUMATIC ARTHROPATHY OF LEFT KNEE: Primary | ICD-10-CM

## 2021-11-02 DIAGNOSIS — M12.561 TRAUMATIC ARTHROPATHY OF RIGHT KNEE: ICD-10-CM

## 2021-11-02 PROCEDURE — 99213 OFFICE O/P EST LOW 20 MIN: CPT | Performed by: PHYSICIAN ASSISTANT

## 2021-11-03 NOTE — PROGRESS NOTES
Subjective:      Patient ID: Gold Flores is a 73 y.o.  male. Chief Complaint   Patient presents with    Knee Pain     BWC: Traumatic arthopathy of B Knee        HPI:  Here for annual follow up visit. S/P bilateral knee arthroplasty. The date of procedure-right total knee arthroplasty 2007, left total knee arthroplasty 2009. Surgeon: Sandra Wu  Issues or complaints: Leg pain left greater than right with prolonged standing or walking. Review of Systems:   A 14 point review of systems and history form completed by the patient has been reviewed. This form is scanned in the media tab of the patient's chart under today's date.      Past Medical History:   Diagnosis Date    Acute rhinosinusitis 11/8/2013    Anxiety     Arthritis     Chronic back pain     Depression     Diabetes mellitus ([facility] [address]     Hernia of unspecified site of abdominal cavity without mention of obstruction or gangrene     Hyperlipidemia     Hypertension     no hx HTN    Hypothyroidism     Kidney stones     Movement disorder     Osteoarthritis     Pneumonia     Type II or unspecified type diabetes mellitus without mention of complication, not stated as uncontrolled     Vitamin D deficiency        Family History   Problem Relation Age of Onset    Cancer Father     Arthritis Other     Cancer Other     Diabetes Other     Heart Disease Other     High Blood Pressure Other        Past Surgical History:   Procedure Laterality Date    CHOLECYSTECTOMY      CYSTOSCOPY  04/16/2010    CYSTOSCOPY  03/21/11    HERNIA REPAIR      umbilical    JOINT REPLACEMENT      right    JOINT REPLACEMENT      left    LITHOTRIPSY      TOTAL KNEE ARTHROPLASTY      two    URETER STENT PLACEMENT      URETHRAL STRICTURE DILATATION  03/21/11       Social History     Occupational History    Occupation: disabled/retired   Tobacco Use    Smoking status: Former Smoker     Packs/day: 1.00     Years: 20.00     Pack years: 20.00 Types: Cigarettes     Quit date: 2012     Years since quittin.4    Smokeless tobacco: Never Used    Tobacco comment: quit   Substance and Sexual Activity    Alcohol use: No    Drug use: No    Sexual activity: Yes     Partners: Female       Current Outpatient Medications   Medication Sig Dispense Refill    metoprolol tartrate (LOPRESSOR) 25 MG tablet       aspirin 81 MG tablet Take 81 mg by mouth daily      ezetimibe (ZETIA) 10 MG tablet       potassium chloride (KLOR-CON) 8 MEQ extended release tablet Take 1 tablet by mouth      magnesium oxide (MAG-OX) 400 MG tablet Take 400 mg by mouth daily      vitamin B-12 (CYANOCOBALAMIN) 1000 MCG tablet Take 1,000 mcg by mouth daily      Cholecalciferol (D3 ADULT PO) Take by mouth      Krill Oil 350 MG CAPS Take by mouth      Insulin NPH Isophane & Regular (NOVOLIN 70/30 FLEXPEN RELION SC) Inject 34 Units into the skin 2 times daily      metFORMIN (GLUCOPHAGE) 1000 MG tablet Take 1,000 mg by mouth 2 times daily (with meals)      levothyroxine (SYNTHROID) 25 MCG tablet Take 1 tablet by mouth Daily (Patient taking differently: Take 88 mcg by mouth Daily ) 30 tablet 1     No current facility-administered medications for this visit. Objective:   He is alert, oriented x 3, pleasant, well nourished, developed and in no acute distress. Resp 18   Ht 5' 11\" (1.803 m)   Wt 288 lb (130.6 kg)   BMI 40.17 kg/m²        Examination of the left knee: Inspection of the skin demonstrates a well-healed midline incision. Inspection of the soft tissues without significant swelling or erythema. The overall alignment of the knee is neutral.  There is minimal intra-articular effusion. AROM     Extension 0     Flexion  120   There is no pain associated with ROM testing. Pes anserine bursa non-tender to palpation. Patellar tendon non-tender to palpation. Quadriceps tendon non-tender to palpation. Collateral ligaments non-tender to palpation.   Popliteal fossa non-tender to palpation. Retro patellar crepitus is not present. There is no instability with varus/valgus stress testing in full extension or 90 degrees of flexion. There is no instability with anterior drawer testing. Extensor Mechanism is intact. Examination of the right knee: Inspection of the skin demonstrates a well-healed midline incision. Inspection of the soft tissues without significant swelling or erythema. The overall alignment of the knee is neutral.  There is minimal intra-articular effusion. AROM     Extension 0     Flexion  120   There is no pain associated with ROM testing. Pes anserine bursa non-tender to palpation. Patellar tendon non-tender to palpation. Quadriceps tendon non-tender to palpation. Collateral ligaments non-tender to palpation. Popliteal fossa non-tender to palpation. Retro patellar crepitus is not present. There is no instability with varus/valgus stress testing in full extension or 90 degrees of flexion. There is no instability with anterior drawer testing. Extensor Mechanism is intact. Examination of the lower extremities are intact with sensation to light touch. Motor testing  5/5 in all major motor groups of the lower extremities. SLR negative. Examination of the lower extremities shows intact perfusion to all extremities. No cyanosis. Digits are warm to touch, capillary refill is less than 2 seconds. There is mild edema noted. Examination of the skin over both lower extremities reveals: The skin to be intact without lacerations or abrasions. No significant erythema. No rashes or skin lesions. X Rays: was performed in the office today:   AP Standing, Lateral and Sunrise Right Knee: There is a right uncemented total knee arthroplasty present. The alignment is satisfactory. There are no signs of acute failure or loosening. There may be some early wear of the medial poly without osteolysis.      AP Standing, Lateral and Sunrise Left Knee: There is a left cemeted knee arthroplasty present. The alignment is satisfactory. There are no signs of acute failure or loosening. Diagnosis:        ICD-10-CM    1. Traumatic arthropathy of left knee  M12.562 XR KNEE BILATERAL STANDING     XR KNEE LEFT (1-2 VIEWS)   2. Traumatic arthropathy of right knee  M12.561 XR KNEE BILATERAL STANDING     XR KNEE RIGHT (1-2 VIEWS)          Assessment/ Plan:     Assessment:    Clinically and radiographically stable left and right knee arthroplasty 14 and 12 years post op. [facility]   Leg complaints most likely lumbar stenosis in nature. Not related to his knee arthritis or knee arthroplasty. Plan:  Medications-discussed risk and benefits of prophylactic antibiotics. Follow up- 6 months. Call or return to clinic if these symptoms worsen or fail to improve as anticipated.

## 2022-05-03 ENCOUNTER — OFFICE VISIT (OUTPATIENT)
Dept: ORTHOPEDIC SURGERY | Age: 72
End: 2022-05-03
Payer: COMMERCIAL

## 2022-05-03 VITALS — RESPIRATION RATE: 20 BRPM | HEIGHT: 71 IN | BODY MASS INDEX: 40.32 KG/M2 | WEIGHT: 288 LBS

## 2022-05-03 DIAGNOSIS — Z96.653 S/P TOTAL KNEE ARTHROPLASTY, BILATERAL: ICD-10-CM

## 2022-05-03 DIAGNOSIS — M12.561 TRAUMATIC ARTHROPATHY OF RIGHT KNEE: ICD-10-CM

## 2022-05-03 DIAGNOSIS — M12.562 TRAUMATIC ARTHROPATHY OF LEFT KNEE: Primary | ICD-10-CM

## 2022-05-03 PROCEDURE — 99212 OFFICE O/P EST SF 10 MIN: CPT | Performed by: PHYSICIAN ASSISTANT

## 2022-05-04 PROBLEM — Z96.653 S/P TOTAL KNEE ARTHROPLASTY, BILATERAL: Status: ACTIVE | Noted: 2022-05-04

## 2022-05-04 NOTE — PROGRESS NOTES
Subjective:      Patient ID: Jil Friday is a 73 y.o. male who is here for follow up evaluation of left and right knee. He is status post bilateral knee arthroplasties for posttraumatic arthritis of the knees. [facility] related. He is here for his 6-month follow-up. No issues or complaints. Review Of Systems:   Negative for fever or chills. Negative for numbness or tingling in the affected extremity.        Past Medical History:   Diagnosis Date    Acute rhinosinusitis 2013    Anxiety     Arthritis     Chronic back pain     Depression     Diabetes mellitus ([facility] [address]     Hernia of unspecified site of abdominal cavity without mention of obstruction or gangrene     Hyperlipidemia     Hypertension     no hx HTN    Hypothyroidism     Kidney stones     Movement disorder     Osteoarthritis     Pneumonia     Type II or unspecified type diabetes mellitus without mention of complication, not stated as uncontrolled     Vitamin D deficiency        Family History   Problem Relation Age of Onset    Cancer Father     Arthritis Other     Cancer Other     Diabetes Other     Heart Disease Other     High Blood Pressure Other        Past Surgical History:   Procedure Laterality Date    CHOLECYSTECTOMY      CYSTOSCOPY  2010    CYSTOSCOPY  11    HERNIA REPAIR      umbilical    JOINT REPLACEMENT      right    JOINT REPLACEMENT      left    LITHOTRIPSY      TOTAL KNEE ARTHROPLASTY      two    URETER STENT PLACEMENT      URETHRAL STRICTURE DILATATION  11       Social History     Occupational History    Occupation: disabled/retired   Tobacco Use    Smoking status: Former Smoker     Packs/day: 1.00     Years: 20.00     Pack years: 20.00     Types: Cigarettes     Quit date: 2012     Years since quittin.9    Smokeless tobacco: Never Used    Tobacco comment: quit   Substance and Sexual Activity    Alcohol use: No    Drug use: No    Sexual activity: Yes     Partners: Female Current Outpatient Medications   Medication Sig Dispense Refill    metoprolol tartrate (LOPRESSOR) 25 MG tablet       aspirin 81 MG tablet Take 81 mg by mouth daily      ezetimibe (ZETIA) 10 MG tablet       potassium chloride (KLOR-CON) 8 MEQ extended release tablet Take 1 tablet by mouth      magnesium oxide (MAG-OX) 400 MG tablet Take 400 mg by mouth daily      vitamin B-12 (CYANOCOBALAMIN) 1000 MCG tablet Take 1,000 mcg by mouth daily      Cholecalciferol (D3 ADULT PO) Take by mouth      Krill Oil 350 MG CAPS Take by mouth      Insulin NPH Isophane & Regular (NOVOLIN 70/30 FLEXPEN RELION SC) Inject 34 Units into the skin 2 times daily      metFORMIN (GLUCOPHAGE) 1000 MG tablet Take 1,000 mg by mouth 2 times daily (with meals)      levothyroxine (SYNTHROID) 25 MCG tablet Take 1 tablet by mouth Daily (Patient taking differently: Take 88 mcg by mouth Daily ) 30 tablet 1     No current facility-administered medications for this visit. Objective:     He is alert, oriented x 3, pleasant, well nourished, developed and in no   acute distress. Resp 20   Ht 5' 11\" (1.803 m)   Wt 288 lb (130.6 kg)   BMI 40.17 kg/m²      Examination of the bilateral knee: Inspection of the skin demonstrates a well-healed midline incision. Inspection of the soft tissues without significant swelling or erythema. The overall alignment of the knee is neutral.  There is minimal intra-articular effusion. AROM     Extension 0     Flexion  120   There no pain associated with ROM testing. Pes anserine bursa non-tender to palpation. Patellar tendon non-tender to palpation. Quadriceps tendon non-tender to palpation. Collateral ligaments non-tender to palpation. Popliteal fossa non-tender to palpation. Retro patellar crepitus is present. There is no instability with varus/valgus stress testing in full extension or 90 degrees of flexion. There is no instability with anterior drawer testing.   Extensor Mechanism is intact. X Rays: not performed in the office today:     Diagnosis:       ICD-10-CM    1. Traumatic arthropathy of left knee  M12.562    2. Traumatic arthropathy of right knee  M12.561    3. S/P total knee arthroplasty, bilateral  Z96.653         Assessment and Plan:       Assessment:  Posttraumatic arthritis left and right knee status post bilateral knee arthroplasties. Knee arthroplasties appear to be doing well. He has no complaints or issues today. Follow up- 6 months. Call or return to clinic if these symptoms worsen or fail to improve as anticipated. Yann Encarnacion PA-C   Senior Physician Assistant   [facility]                                         Disclaimer: This note was generated with use of a verbal recognition program (DRAGON) and an attempt was made to check for errors. It is possible that there are still dictated errors within this office note. If so, please bring any significant errors to my attention for an addendum. All efforts were made to ensure that this office note is accurate.

## 2022-10-13 ENCOUNTER — OFFICE VISIT (OUTPATIENT)
Dept: ORTHOPEDIC SURGERY | Age: 72
End: 2022-10-13
Payer: COMMERCIAL

## 2022-10-13 VITALS — BODY MASS INDEX: 40.32 KG/M2 | WEIGHT: 288 LBS | HEIGHT: 71 IN | RESPIRATION RATE: 16 BRPM

## 2022-10-13 DIAGNOSIS — M12.561 TRAUMATIC ARTHROPATHY OF RIGHT KNEE: ICD-10-CM

## 2022-10-13 DIAGNOSIS — Z96.653 S/P TOTAL KNEE ARTHROPLASTY, BILATERAL: ICD-10-CM

## 2022-10-13 DIAGNOSIS — M12.562 TRAUMATIC ARTHROPATHY OF LEFT KNEE: Primary | ICD-10-CM

## 2022-10-13 PROCEDURE — 99213 OFFICE O/P EST LOW 20 MIN: CPT | Performed by: PHYSICIAN ASSISTANT

## 2022-10-13 PROCEDURE — 1123F ACP DISCUSS/DSCN MKR DOCD: CPT | Performed by: PHYSICIAN ASSISTANT

## 2022-10-13 NOTE — PROGRESS NOTES
Subjective:      Patient ID: Suhail Wood is a 81 y.o.  male. Here for annual follow up visit. S/P right and left knee arthroplasty performed 4 posttraumatic arthritis. [facility] related. Right total knee 2007, left total knee 2009. Surgeon: Kaylyn Edmonds  Issues or complaints: No issues or complaints with the left or right knee. Occasional pain with prolonged standing and walking. He is under going triple bypass surgery tomorrow. Review of Systems:  I have reviewed the clinically relevant past medical history, medications, allergies, family history, social history, and 13 point Review of Systems from the patient's recent history form & documented any details relevant to today's presenting complaints in the history above. The patient's self-reported past medical history, medications, allergies, family history, social history, and Review of Systems form from 5/3/2022 have been scanned into the chart under the \"Media\" tab.        Past Medical History:   Diagnosis Date    Acute rhinosinusitis 11/8/2013    Anxiety     Arthritis     Chronic back pain     Depression     Diabetes mellitus ([facility] [address]     Hernia of unspecified site of abdominal cavity without mention of obstruction or gangrene     Hyperlipidemia     Hypertension     no hx HTN    Hypothyroidism     Kidney stones     Movement disorder     Osteoarthritis     Pneumonia     Type II or unspecified type diabetes mellitus without mention of complication, not stated as uncontrolled     Vitamin D deficiency        Family History   Problem Relation Age of Onset    Cancer Father     Arthritis Other     Cancer Other     Diabetes Other     Heart Disease Other     High Blood Pressure Other        Past Surgical History:   Procedure Laterality Date    CHOLECYSTECTOMY      CYSTOSCOPY  04/16/2010    CYSTOSCOPY  03/21/11    HERNIA REPAIR      umbilical    JOINT REPLACEMENT      right    JOINT REPLACEMENT      left    LITHOTRIPSY      TOTAL KNEE ARTHROPLASTY      two    URETER STENT PLACEMENT      URETHRAL STRICTURE DILATATION  03/21/11       Social History     Occupational History    Occupation: disabled/retired   Tobacco Use    Smoking status: Former     Packs/day: 1.00     Years: 20.00     Pack years: 20.00     Types: Cigarettes     Quit date: 6/1/2012     Years since quitting: 10.3    Smokeless tobacco: Never    Tobacco comments:     quit   Substance and Sexual Activity    Alcohol use: No    Drug use: No    Sexual activity: Yes     Partners: Female       Current Outpatient Medications   Medication Sig Dispense Refill    metoprolol tartrate (LOPRESSOR) 25 MG tablet       aspirin 81 MG tablet Take 81 mg by mouth daily      ezetimibe (ZETIA) 10 MG tablet       potassium chloride (KLOR-CON) 8 MEQ extended release tablet Take 1 tablet by mouth      magnesium oxide (MAG-OX) 400 MG tablet Take 400 mg by mouth daily      vitamin B-12 (CYANOCOBALAMIN) 1000 MCG tablet Take 1,000 mcg by mouth daily      Cholecalciferol (D3 ADULT PO) Take by mouth      Krill Oil 350 MG CAPS Take by mouth      Insulin NPH Isophane & Regular (NOVOLIN 70/30 FLEXPEN RELION SC) Inject 34 Units into the skin 2 times daily      metFORMIN (GLUCOPHAGE) 1000 MG tablet Take 1,000 mg by mouth 2 times daily (with meals)      levothyroxine (SYNTHROID) 25 MCG tablet Take 1 tablet by mouth Daily (Patient taking differently: Take 88 mcg by mouth Daily ) 30 tablet 1     No current facility-administered medications for this visit. Objective:   He is alert, oriented x 3, pleasant, well nourished, developed and in no acute distress. Resp 16   Ht 5' 11\" (1.803 m)   Wt 288 lb (130.6 kg)   BMI 40.17 kg/m²      Examination of the right and left knee: Inspection of the skin demonstrates a well-healed midline incision. Inspection of the soft tissues without significant swelling or erythema. The overall alignment of the knee is neutral.  There is minimal intra-articular effusion.   AROM     Extension 0     Flexion  120 There mild pain associated with ROM testing. Pes anserine bursa non-tender to palpation. Patellar tendon non-tender to palpation. Quadriceps tendon non-tender to palpation. Collateral ligaments non-tender to palpation. Popliteal fossa non-tender to palpation. Retro patellar crepitus is present. There is no instability with varus/valgus stress testing in full extension or 90 degrees of flexion. There is no instability with anterior drawer testing. Extensor Mechanism is intact. X Rays: were performed in the office today:   AP Standing, Lateral and Sunrise Left Knee: There is a left knee arthroplasty present. The alignment is satisfactory. There are no signs of failure or loosening. AP Standing, Lateral and Sunrise Right Knee: There is a right total knee arthroplasty present. The alignment is satisfactory. There are no signs of failure or loosening. Diagnosis:        ICD-10-CM    1. Traumatic arthropathy of left knee  M12.562 XR KNEE BILATERAL STANDING     XR KNEE RIGHT (1-2 VIEWS)     XR KNEE LEFT (1-2 VIEWS)      2. Traumatic arthropathy of right knee  M12.561 XR KNEE BILATERAL STANDING     XR KNEE RIGHT (1-2 VIEWS)     XR KNEE LEFT (1-2 VIEWS)      3. S/P total knee arthroplasty, bilateral  Z96.653              Assessment/ Plan:     Assessment:    Clinically and radiographically stable left and right knee arthroplasty. [facility] related for posttraumatic arthritis. Plan:  Medications-discussed the risk and benefits of prophylactic antibiotics for dental and surgical procedures. PT- A home exercise program was instructed today including ROM exercises and strengthening exercises. The patient verbalized understanding of these exercises as well as the importance of the exercise program to promote return of normal function. If pain intensifies or other problems arise you are to notify the office. Follow up- 6 months.    Call or return to clinic if these symptoms worsen or fail to improve as anticipated.

## 2023-10-10 ENCOUNTER — OFFICE VISIT (OUTPATIENT)
Dept: ORTHOPEDIC SURGERY | Age: 73
End: 2023-10-10

## 2023-10-10 VITALS — WEIGHT: 288 LBS | RESPIRATION RATE: 16 BRPM | BODY MASS INDEX: 40.32 KG/M2 | HEIGHT: 71 IN

## 2023-10-10 DIAGNOSIS — M12.562 TRAUMATIC ARTHROPATHY OF LEFT KNEE: Primary | ICD-10-CM

## 2023-10-10 DIAGNOSIS — M12.561 TRAUMATIC ARTHROPATHY OF RIGHT KNEE: ICD-10-CM

## 2023-10-10 NOTE — PROGRESS NOTES
Subjective:      Patient ID: Daniel Curry is a 72 y.o.  male. Here for annual follow up visit for [facility]. S/P right and left knee arthroplasty. The date of procedure-   Right TKA   Left TKA . Surgeon: Owen Ramirez   Issues or complaints: Mild discomfort/ pain. ROS:  Constitutional: denies fever, chills, weight loss. MSK: denies pain in other joints, muscle aches. Neurological: denies numbness, tingling, weakness.      Past Medical History:   Diagnosis Date    Acute rhinosinusitis 2013    Anxiety     Arthritis     Chronic back pain     Depression     Diabetes mellitus ([address])     Hernia of unspecified site of abdominal cavity without mention of obstruction or gangrene     Hyperlipidemia     Hypertension     no hx HTN    Hypothyroidism     Kidney stones     Movement disorder     Osteoarthritis     Pneumonia     Type II or unspecified type diabetes mellitus without mention of complication, not stated as uncontrolled     Vitamin D deficiency        Family History   Problem Relation Age of Onset    Cancer Father     Arthritis Other     Cancer Other     Diabetes Other     Heart Disease Other     High Blood Pressure Other        Past Surgical History:   Procedure Laterality Date    CHOLECYSTECTOMY      CYSTOSCOPY  2010    CYSTOSCOPY  11    HERNIA REPAIR      umbilical    JOINT REPLACEMENT      right    JOINT REPLACEMENT      left    LITHOTRIPSY      TOTAL KNEE ARTHROPLASTY      two    URETER STENT PLACEMENT      URETHRAL STRICTURE DILATATION  11       Social History     Occupational History    Occupation: disabled/retired   Tobacco Use    Smoking status: Former     Packs/day: 1.00     Years: 20.00     Additional pack years: 0.00     Total pack years: 20.00     Types: Cigarettes     Quit date: 2012     Years since quittin.3    Smokeless tobacco: Never    Tobacco comments:     quit   Substance and Sexual Activity    Alcohol use: No    Drug use: No    Sexual activity: Yes

## 2024-01-02 NOTE — PROGRESS NOTES
[facility]      CONSULTATION  [phone]  1/2/24  Referring: Dr. Toussaint ref. provider found    or  Humberto Barnhart (PCP)    REASON FOR CONSULT/CHIEF COMPLAINT/HPI     Reason for visit/ Chief complaint  {[email or web address]:[id number]}   HPI Jason Kenney is a 73 y.o. female who is here to establish with our practice (was seen thru [facility]). Her extensive medical history is as noted below and includes CAD w/ CABG 11/2022, dCHF, PHTN, HTN, HLD, PAF (RF flutter ablation 2015). She is a former smoker.    She had an abnormal stress test 8/2022.  LHC/RHC done 10/2022 that showed severe 3VD and severely elevated RH pressures. She was admitted to [facility] with CHF for diuresis and CVTS consult> underwent CABG 11/23/22.    Today, she    Patient is adherent with medications and is tolerating them well without side effects.     HISTORY/ALLERGIES/ROS     MedHx:  has a past medical history of Acute rhinosinusitis, Anxiety, Arthritis, Chronic back pain, Depression, Diabetes mellitus (HCC), Hernia of unspecified site of abdominal cavity without mention of obstruction or gangrene, Hyperlipidemia, Hypertension, Hypothyroidism, Kidney stones, Movement disorder, Osteoarthritis, Pneumonia, Type II or unspecified type diabetes mellitus without mention of complication, not stated as uncontrolled, and Vitamin D deficiency.  SurgHx:  has a past surgical history that includes Total knee arthroplasty; Cystoscopy (04/16/2010); Lithotripsy; Ureter stent placement; joint replacement; joint replacement; hernia repair; Cystoscopy (03/21/11); Urethra dilation (03/21/11); and Cholecystectomy.   SocHx:  reports that he quit smoking about 11 years ago. His smoking use included cigarettes. He started smoking about 31 years ago. He has a 20.0 pack-year smoking history. He has never used smokeless tobacco. He reports that he does not drink alcohol and does not use drugs.   FamHx: ***No family history of premature coronary artery disease, sudden death,

## 2024-01-02 NOTE — PROGRESS NOTES
[facility] [place]      CONSULTATION  [phone]  1/10/24  Referring: Humberto Carolina (PCP)    REASON FOR CONSULT/CHIEF COMPLAINT/HPI     Reason for visit/ Chief complaint  CAD s/p CABG  Wanting to transition his cardiac care to [facility] since wife also comes here    Chief Complaint   Patient presents with    Hypertension     No cardiac symptoms at this time.    Hyperlipidemia    Irregular Heart Beat        HPI Jason Kenney is a 73 y.o. male who is here to establish with our practice (was seen thru [facility]). His extensive medical history is as noted below and includes CAD w/ CABG 11/2022, dCHF, PHTN, HTN, HLD, PAF (RF flutter ablation 2015). He is a former smoker.    He had an abnormal stress test 8/2022.  LHC/RHC done 10/2022 that showed severe 3VD and severely elevated RH pressures. He was admitted to [facility] with CHF for diuresis and CVTS consult> underwent CABG 11/23/22.    Today, he is here to establish care with our practice, since I am his  wife, Sven, cardiologist. He is with his wife. He finished cardiac rehab after CABG. He c/o sob, he lies on right side and does not feel sob.  Denies dizziness, passing out. He's been on eliquis since 2015.  Dr Chan did his CABG.  He states he is addicted to food and does binge eat. Ventral hernia present \"forever\" per pt report.  He has h/o knee replacement. Last A1c 7.6 in 12/2023.    He has noted some occasional hemorrhoidal bleeding a long time ago that became worse after being on Eliquis.    He is unsure whether or not CT surgeon that did his CABG also did an JOHN clip.    Patient is adherent with medications and is tolerating them well without side effects.     HISTORY/ALLERGIES/ROS     MedHx:  has a past medical history of Acute rhinosinusitis, Anxiety, Arthritis, Chronic back pain, Depression, Diabetes mellitus (HCC), Hernia of unspecified site of abdominal cavity without mention of obstruction or gangrene, Hyperlipidemia, Hypertension, Hypothyroidism,

## 2024-01-10 ENCOUNTER — HOSPITAL ENCOUNTER (OUTPATIENT)
Age: 74
Discharge: HOME | End: 2024-01-10
Payer: MEDICARE

## 2024-01-10 ENCOUNTER — OFFICE VISIT (OUTPATIENT)
Dept: CARDIOLOGY CLINIC | Age: 74
End: 2024-01-10

## 2024-01-10 VITALS
BODY MASS INDEX: 43.29 KG/M2 | HEART RATE: 72 BPM | SYSTOLIC BLOOD PRESSURE: 130 MMHG | HEIGHT: 71 IN | WEIGHT: 309.2 LBS | DIASTOLIC BLOOD PRESSURE: 66 MMHG | OXYGEN SATURATION: 93 %

## 2024-01-10 DIAGNOSIS — I48.3 TYPICAL ATRIAL FLUTTER (HCC): ICD-10-CM

## 2024-01-10 DIAGNOSIS — I25.10 CAD IN NATIVE ARTERY: ICD-10-CM

## 2024-01-10 DIAGNOSIS — E66.01 MORBID OBESITY WITH BMI OF 40.0-44.9, ADULT (HCC): ICD-10-CM

## 2024-01-10 DIAGNOSIS — E78.2 MIXED HYPERLIPIDEMIA DUE TO TYPE 2 DIABETES MELLITUS (HCC): ICD-10-CM

## 2024-01-10 DIAGNOSIS — Z86.79 S/P ABLATION OF ATRIAL FLUTTER: ICD-10-CM

## 2024-01-10 DIAGNOSIS — I71.21 ANEURYSM OF ASCENDING AORTA WITHOUT RUPTURE (HCC): ICD-10-CM

## 2024-01-10 DIAGNOSIS — Z95.1 S/P CABG (CORONARY ARTERY BYPASS GRAFT): ICD-10-CM

## 2024-01-10 DIAGNOSIS — Z98.890 S/P ABLATION OF ATRIAL FLUTTER: ICD-10-CM

## 2024-01-10 DIAGNOSIS — D50.9 IRON DEFICIENCY ANEMIA, UNSPECIFIED IRON DEFICIENCY ANEMIA TYPE: Primary | ICD-10-CM

## 2024-01-10 DIAGNOSIS — D50.9 IRON DEFICIENCY ANEMIA, UNSPECIFIED IRON DEFICIENCY ANEMIA TYPE: ICD-10-CM

## 2024-01-10 DIAGNOSIS — E11.69 MIXED HYPERLIPIDEMIA DUE TO TYPE 2 DIABETES MELLITUS (HCC): ICD-10-CM

## 2024-01-10 DIAGNOSIS — I48.0 PAF (PAROXYSMAL ATRIAL FIBRILLATION) (HCC): Primary | ICD-10-CM

## 2024-01-10 DIAGNOSIS — Z79.4 TYPE 2 DIABETES MELLITUS WITH DIABETIC POLYNEUROPATHY, WITH LONG-TERM CURRENT USE OF INSULIN (HCC): ICD-10-CM

## 2024-01-10 DIAGNOSIS — I50.32 CHRONIC DIASTOLIC HEART FAILURE (HCC): ICD-10-CM

## 2024-01-10 DIAGNOSIS — G47.33 OSA (OBSTRUCTIVE SLEEP APNEA): ICD-10-CM

## 2024-01-10 DIAGNOSIS — I25.2 OLD MI (MYOCARDIAL INFARCTION): ICD-10-CM

## 2024-01-10 DIAGNOSIS — I50.42 CHRONIC COMBINED SYSTOLIC AND DIASTOLIC HEART FAILURE (HCC): ICD-10-CM

## 2024-01-10 DIAGNOSIS — I48.0 PAF (PAROXYSMAL ATRIAL FIBRILLATION) (HCC): ICD-10-CM

## 2024-01-10 DIAGNOSIS — E11.42 TYPE 2 DIABETES MELLITUS WITH DIABETIC POLYNEUROPATHY, WITH LONG-TERM CURRENT USE OF INSULIN (HCC): ICD-10-CM

## 2024-01-10 LAB — NT-PROBNP SERPL-MCNC: 387 PG/ML (ref 0–124)

## 2024-01-10 PROCEDURE — 83880 ASSAY OF NATRIURETIC PEPTIDE: CPT

## 2024-01-10 PROCEDURE — 36415 COLL VENOUS BLD VENIPUNCTURE: CPT

## 2024-01-10 RX ORDER — ROSUVASTATIN CALCIUM 20 MG/1
20 TABLET, COATED ORAL DAILY
COMMUNITY
Start: 2023-11-01

## 2024-01-10 RX ORDER — SODIUM CHLORIDE 0.9 % (FLUSH) 0.9 %
5-40 SYRINGE (ML) INJECTION PRN
OUTPATIENT
Start: 2024-01-10

## 2024-01-10 RX ORDER — ROSUVASTATIN CALCIUM 20 MG/1
TABLET, COATED ORAL
COMMUNITY
Start: 2023-11-01

## 2024-01-10 RX ORDER — LISINOPRIL 2.5 MG/1
TABLET ORAL
COMMUNITY
Start: 2023-02-22

## 2024-01-10 RX ORDER — SPIRONOLACTONE 25 MG/1
TABLET ORAL
COMMUNITY
Start: 2023-02-22

## 2024-01-10 RX ORDER — FUROSEMIDE 40 MG/1
40 TABLET ORAL DAILY
COMMUNITY
Start: 2022-10-21

## 2024-01-10 RX ORDER — SODIUM CHLORIDE 9 MG/ML
5-250 INJECTION, SOLUTION INTRAVENOUS PRN
OUTPATIENT
Start: 2024-01-10

## 2024-01-10 NOTE — PATIENT INSTRUCTIONS
Consider starting Mounjaro or Ozympic > discuss with endocrinologist  Referral to EP (electrophysiology) > possible watchman  Echocardiogram  Stress test   Obtain labs  Call PCP > discuss set up of possible colonoscopy  Start IV iron infusion    Call for any change in symptoms, call to report any changes in shortness of breath or development of chest pain with activity.    Follow up with Dr Kohler in 2 mos

## 2024-01-15 ENCOUNTER — TELEPHONE (OUTPATIENT)
Dept: CARDIOLOGY CLINIC | Age: 74
End: 2024-01-15

## 2024-01-15 NOTE — TELEPHONE ENCOUNTER
----- Message from Michael Kohler MD sent at 1/15/2024  9:53 AM EST -----  BNP a little high, please make sure he is taking his lasix 40 mg daily and not just prn

## 2024-01-15 NOTE — TELEPHONE ENCOUNTER
Called patient and spoke to wife and discussed lab results. She states that pt is taking his Lasix daily. Denies any weight changes. She did state that he salts his food a lot so we discussed the need for him to refrain. Wife states she will advise patient. Pt will be getting echo on 1/17 and we will call with results. No further questions at this time.

## 2024-01-17 ENCOUNTER — HOSPITAL ENCOUNTER (OUTPATIENT)
Dept: NON INVASIVE DIAGNOSTICS | Age: 74
Discharge: HOME | End: 2024-01-17
Payer: MEDICARE

## 2024-01-17 DIAGNOSIS — I48.0 PAF (PAROXYSMAL ATRIAL FIBRILLATION) (HCC): ICD-10-CM

## 2024-01-17 DIAGNOSIS — I25.10 CAD IN NATIVE ARTERY: ICD-10-CM

## 2024-01-17 DIAGNOSIS — I25.2 OLD MI (MYOCARDIAL INFARCTION): ICD-10-CM

## 2024-01-17 DIAGNOSIS — E66.01 MORBID OBESITY WITH BMI OF 40.0-44.9, ADULT (HCC): ICD-10-CM

## 2024-01-17 DIAGNOSIS — Z79.4 TYPE 2 DIABETES MELLITUS WITH DIABETIC POLYNEUROPATHY, WITH LONG-TERM CURRENT USE OF INSULIN (HCC): ICD-10-CM

## 2024-01-17 DIAGNOSIS — I50.32 CHRONIC DIASTOLIC HEART FAILURE (HCC): ICD-10-CM

## 2024-01-17 DIAGNOSIS — Z95.1 S/P CABG (CORONARY ARTERY BYPASS GRAFT): ICD-10-CM

## 2024-01-17 DIAGNOSIS — E11.42 TYPE 2 DIABETES MELLITUS WITH DIABETIC POLYNEUROPATHY, WITH LONG-TERM CURRENT USE OF INSULIN (HCC): ICD-10-CM

## 2024-01-17 DIAGNOSIS — E78.2 MIXED HYPERLIPIDEMIA DUE TO TYPE 2 DIABETES MELLITUS (HCC): ICD-10-CM

## 2024-01-17 DIAGNOSIS — Z86.79 S/P ABLATION OF ATRIAL FLUTTER: ICD-10-CM

## 2024-01-17 DIAGNOSIS — Z98.890 S/P ABLATION OF ATRIAL FLUTTER: ICD-10-CM

## 2024-01-17 DIAGNOSIS — E11.69 MIXED HYPERLIPIDEMIA DUE TO TYPE 2 DIABETES MELLITUS (HCC): ICD-10-CM

## 2024-01-17 DIAGNOSIS — I71.21 ANEURYSM OF ASCENDING AORTA WITHOUT RUPTURE (HCC): ICD-10-CM

## 2024-01-17 DIAGNOSIS — I50.42 CHRONIC COMBINED SYSTOLIC AND DIASTOLIC HEART FAILURE (HCC): ICD-10-CM

## 2024-01-17 DIAGNOSIS — D50.9 IRON DEFICIENCY ANEMIA, UNSPECIFIED IRON DEFICIENCY ANEMIA TYPE: ICD-10-CM

## 2024-01-17 DIAGNOSIS — I48.3 TYPICAL ATRIAL FLUTTER (HCC): ICD-10-CM

## 2024-01-17 DIAGNOSIS — G47.33 OSA (OBSTRUCTIVE SLEEP APNEA): ICD-10-CM

## 2024-01-17 PROCEDURE — C8929 TTE W OR WO FOL WCON,DOPPLER: HCPCS

## 2024-01-17 PROCEDURE — 6360000004 HC RX CONTRAST MEDICATION: Performed by: INTERNAL MEDICINE

## 2024-01-17 RX ADMIN — PERFLUTREN 1.5 ML: 6.52 INJECTION, SUSPENSION INTRAVENOUS at 13:56

## 2024-01-18 ENCOUNTER — TELEPHONE (OUTPATIENT)
Dept: CARDIOLOGY CLINIC | Age: 74
End: 2024-01-18

## 2024-01-18 NOTE — TELEPHONE ENCOUNTER
----- Message from Michael Kohler MD sent at 1/18/2024  3:21 PM EST -----  Echo normal, except for mild diastolic dysfunction  Nothing new to add beyond last office visit

## 2024-01-31 ENCOUNTER — CLINICAL DOCUMENTATION (OUTPATIENT)
Dept: ONCOLOGY | Age: 74
End: 2024-01-31

## 2024-02-12 ENCOUNTER — HOSPITAL ENCOUNTER (OUTPATIENT)
Dept: ONCOLOGY | Age: 74
Setting detail: INFUSION SERIES
Discharge: HOME | End: 2024-02-12
Payer: MEDICARE

## 2024-02-12 VITALS
TEMPERATURE: 96.9 F | RESPIRATION RATE: 16 BRPM | HEART RATE: 69 BPM | SYSTOLIC BLOOD PRESSURE: 121 MMHG | DIASTOLIC BLOOD PRESSURE: 72 MMHG

## 2024-02-12 DIAGNOSIS — D50.9 IRON DEFICIENCY ANEMIA, UNSPECIFIED IRON DEFICIENCY ANEMIA TYPE: Primary | ICD-10-CM

## 2024-02-12 PROCEDURE — 2580000003 HC RX 258: Performed by: INTERNAL MEDICINE

## 2024-02-12 PROCEDURE — 6360000002 HC RX W HCPCS: Performed by: INTERNAL MEDICINE

## 2024-02-12 PROCEDURE — 96365 THER/PROPH/DIAG IV INF INIT: CPT

## 2024-02-12 PROCEDURE — 99211 OFF/OP EST MAY X REQ PHY/QHP: CPT

## 2024-02-12 RX ORDER — SODIUM CHLORIDE 9 MG/ML
5-250 INJECTION, SOLUTION INTRAVENOUS PRN
Status: DISCONTINUED | OUTPATIENT
Start: 2024-02-12 | End: 2024-02-13 | Stop reason: HOSPADM

## 2024-02-12 RX ORDER — SODIUM CHLORIDE 9 MG/ML
5-250 INJECTION, SOLUTION INTRAVENOUS PRN
Status: CANCELLED | OUTPATIENT
Start: 2024-02-19

## 2024-02-12 RX ORDER — SODIUM CHLORIDE 0.9 % (FLUSH) 0.9 %
5-40 SYRINGE (ML) INJECTION PRN
Status: DISCONTINUED | OUTPATIENT
Start: 2024-02-12 | End: 2024-02-13 | Stop reason: HOSPADM

## 2024-02-12 RX ORDER — SODIUM CHLORIDE 0.9 % (FLUSH) 0.9 %
5-40 SYRINGE (ML) INJECTION PRN
Status: CANCELLED | OUTPATIENT
Start: 2024-02-19

## 2024-02-12 RX ADMIN — Medication 10 ML: at 14:00

## 2024-02-12 RX ADMIN — SODIUM CHLORIDE 200 MG: 9 INJECTION, SOLUTION INTRAVENOUS at 14:02

## 2024-02-12 RX ADMIN — SODIUM CHLORIDE 200 ML/HR: 9 INJECTION, SOLUTION INTRAVENOUS at 14:01

## 2024-02-12 NOTE — PROGRESS NOTES
Patient ambulatory to department for first of five doses of venofer. Tolerated venofer infusion well with no adverse rx noted.Given avs with information about venofer. Verbally told about most common possible side effects. To return next week for next infusion.

## 2024-02-19 ENCOUNTER — HOSPITAL ENCOUNTER (OUTPATIENT)
Dept: ONCOLOGY | Age: 74
Setting detail: INFUSION SERIES
Discharge: HOME | End: 2024-02-19
Payer: MEDICARE

## 2024-02-19 VITALS
RESPIRATION RATE: 16 BRPM | SYSTOLIC BLOOD PRESSURE: 120 MMHG | TEMPERATURE: 97.6 F | DIASTOLIC BLOOD PRESSURE: 74 MMHG | HEART RATE: 73 BPM

## 2024-02-19 DIAGNOSIS — D50.9 IRON DEFICIENCY ANEMIA, UNSPECIFIED IRON DEFICIENCY ANEMIA TYPE: Primary | ICD-10-CM

## 2024-02-19 PROCEDURE — 99211 OFF/OP EST MAY X REQ PHY/QHP: CPT

## 2024-02-19 PROCEDURE — 6360000002 HC RX W HCPCS: Performed by: INTERNAL MEDICINE

## 2024-02-19 PROCEDURE — 2580000003 HC RX 258: Performed by: INTERNAL MEDICINE

## 2024-02-19 PROCEDURE — 96365 THER/PROPH/DIAG IV INF INIT: CPT

## 2024-02-19 RX ORDER — FERROUS SULFATE 325(65) MG
325 TABLET ORAL
COMMUNITY

## 2024-02-19 RX ORDER — SODIUM CHLORIDE 0.9 % (FLUSH) 0.9 %
5-40 SYRINGE (ML) INJECTION PRN
Status: CANCELLED | OUTPATIENT
Start: 2024-02-26

## 2024-02-19 RX ORDER — SODIUM CHLORIDE 9 MG/ML
5-250 INJECTION, SOLUTION INTRAVENOUS PRN
Status: CANCELLED | OUTPATIENT
Start: 2024-02-26

## 2024-02-19 RX ORDER — SODIUM CHLORIDE 9 MG/ML
5-250 INJECTION, SOLUTION INTRAVENOUS PRN
Status: DISCONTINUED | OUTPATIENT
Start: 2024-02-19 | End: 2024-02-20 | Stop reason: HOSPADM

## 2024-02-19 RX ORDER — SODIUM CHLORIDE 0.9 % (FLUSH) 0.9 %
5-40 SYRINGE (ML) INJECTION PRN
Status: DISCONTINUED | OUTPATIENT
Start: 2024-02-19 | End: 2024-02-20 | Stop reason: HOSPADM

## 2024-02-19 RX ADMIN — SODIUM CHLORIDE 100 ML/HR: 9 INJECTION, SOLUTION INTRAVENOUS at 14:02

## 2024-02-19 RX ADMIN — Medication 200 MG: at 14:03

## 2024-02-19 NOTE — PROGRESS NOTES
Patient ambulatory to department for second of five doses of venofer. Tolerated venofer infusion well with no adverse rx noted.Given avs with information about venofer. Verbally told about most common possible side effects. To return next week for next infusion.

## 2024-02-26 ENCOUNTER — HOSPITAL ENCOUNTER (OUTPATIENT)
Dept: ONCOLOGY | Age: 74
Setting detail: INFUSION SERIES
Discharge: HOME | End: 2024-02-26
Payer: MEDICARE

## 2024-02-26 VITALS
SYSTOLIC BLOOD PRESSURE: 135 MMHG | DIASTOLIC BLOOD PRESSURE: 61 MMHG | HEART RATE: 70 BPM | TEMPERATURE: 97.4 F | RESPIRATION RATE: 16 BRPM

## 2024-02-26 DIAGNOSIS — D50.9 IRON DEFICIENCY ANEMIA, UNSPECIFIED IRON DEFICIENCY ANEMIA TYPE: Primary | ICD-10-CM

## 2024-02-26 PROCEDURE — 96365 THER/PROPH/DIAG IV INF INIT: CPT

## 2024-02-26 PROCEDURE — 6360000002 HC RX W HCPCS: Performed by: INTERNAL MEDICINE

## 2024-02-26 PROCEDURE — 99211 OFF/OP EST MAY X REQ PHY/QHP: CPT

## 2024-02-26 PROCEDURE — 2580000003 HC RX 258: Performed by: INTERNAL MEDICINE

## 2024-02-26 RX ORDER — SODIUM CHLORIDE 9 MG/ML
5-250 INJECTION, SOLUTION INTRAVENOUS PRN
Status: DISCONTINUED | OUTPATIENT
Start: 2024-02-26 | End: 2024-02-27 | Stop reason: HOSPADM

## 2024-02-26 RX ORDER — SODIUM CHLORIDE 9 MG/ML
5-250 INJECTION, SOLUTION INTRAVENOUS PRN
Status: CANCELLED | OUTPATIENT
Start: 2024-03-04

## 2024-02-26 RX ORDER — SODIUM CHLORIDE 0.9 % (FLUSH) 0.9 %
5-40 SYRINGE (ML) INJECTION PRN
Status: CANCELLED | OUTPATIENT
Start: 2024-03-04

## 2024-02-26 RX ORDER — SODIUM CHLORIDE 0.9 % (FLUSH) 0.9 %
5-40 SYRINGE (ML) INJECTION PRN
Status: DISCONTINUED | OUTPATIENT
Start: 2024-02-26 | End: 2024-02-27 | Stop reason: HOSPADM

## 2024-02-26 RX ADMIN — SODIUM CHLORIDE 100 ML/HR: 9 INJECTION, SOLUTION INTRAVENOUS at 14:01

## 2024-02-26 RX ADMIN — Medication 200 MG: at 14:02

## 2024-02-26 NOTE — PROGRESS NOTES
Patient ambulatory to department for third of five doses of venofer. Tolerated venofer infusion well with no adverse rx noted.Given avs with information about venofer. Verbally told about most common possible side effects. To return next week for next infusion.

## 2024-03-04 ENCOUNTER — HOSPITAL ENCOUNTER (OUTPATIENT)
Dept: ONCOLOGY | Age: 74
Setting detail: INFUSION SERIES
Discharge: HOME | End: 2024-03-04
Payer: MEDICARE

## 2024-03-04 VITALS
SYSTOLIC BLOOD PRESSURE: 125 MMHG | DIASTOLIC BLOOD PRESSURE: 66 MMHG | HEART RATE: 73 BPM | TEMPERATURE: 96.7 F | RESPIRATION RATE: 16 BRPM

## 2024-03-04 DIAGNOSIS — D50.9 IRON DEFICIENCY ANEMIA, UNSPECIFIED IRON DEFICIENCY ANEMIA TYPE: Primary | ICD-10-CM

## 2024-03-04 PROCEDURE — 99211 OFF/OP EST MAY X REQ PHY/QHP: CPT

## 2024-03-04 PROCEDURE — 6360000002 HC RX W HCPCS: Performed by: INTERNAL MEDICINE

## 2024-03-04 PROCEDURE — 96365 THER/PROPH/DIAG IV INF INIT: CPT

## 2024-03-04 PROCEDURE — 2580000003 HC RX 258: Performed by: INTERNAL MEDICINE

## 2024-03-04 RX ORDER — SODIUM CHLORIDE 0.9 % (FLUSH) 0.9 %
5-40 SYRINGE (ML) INJECTION PRN
Status: DISCONTINUED | OUTPATIENT
Start: 2024-03-04 | End: 2024-03-05 | Stop reason: HOSPADM

## 2024-03-04 RX ORDER — SODIUM CHLORIDE 9 MG/ML
5-250 INJECTION, SOLUTION INTRAVENOUS PRN
Status: DISCONTINUED | OUTPATIENT
Start: 2024-03-04 | End: 2024-03-05 | Stop reason: HOSPADM

## 2024-03-04 RX ORDER — SODIUM CHLORIDE 9 MG/ML
5-250 INJECTION, SOLUTION INTRAVENOUS PRN
OUTPATIENT
Start: 2024-03-11

## 2024-03-04 RX ORDER — SODIUM CHLORIDE 0.9 % (FLUSH) 0.9 %
5-40 SYRINGE (ML) INJECTION PRN
OUTPATIENT
Start: 2024-03-11

## 2024-03-04 RX ADMIN — Medication 200 MG: at 13:53

## 2024-03-04 RX ADMIN — SODIUM CHLORIDE 200 ML/HR: 9 INJECTION, SOLUTION INTRAVENOUS at 13:52

## 2024-03-04 NOTE — PROGRESS NOTES
Patient ambulatory to department for four of five doses of venofer. Tolerated venofer infusion well with no adverse rx noted.Verbally told about most common possible side effects. To return next week for next infusion.

## 2024-03-11 ENCOUNTER — HOSPITAL ENCOUNTER (OUTPATIENT)
Dept: ONCOLOGY | Age: 74
Setting detail: INFUSION SERIES
Discharge: HOME | End: 2024-03-11
Payer: MEDICARE

## 2024-03-11 VITALS
HEART RATE: 74 BPM | DIASTOLIC BLOOD PRESSURE: 66 MMHG | TEMPERATURE: 96.9 F | SYSTOLIC BLOOD PRESSURE: 127 MMHG | RESPIRATION RATE: 16 BRPM

## 2024-03-11 DIAGNOSIS — D50.9 IRON DEFICIENCY ANEMIA, UNSPECIFIED IRON DEFICIENCY ANEMIA TYPE: Primary | ICD-10-CM

## 2024-03-11 PROCEDURE — 2580000003 HC RX 258: Performed by: INTERNAL MEDICINE

## 2024-03-11 PROCEDURE — 6360000002 HC RX W HCPCS: Performed by: INTERNAL MEDICINE

## 2024-03-11 PROCEDURE — 99211 OFF/OP EST MAY X REQ PHY/QHP: CPT

## 2024-03-11 PROCEDURE — 96365 THER/PROPH/DIAG IV INF INIT: CPT

## 2024-03-11 RX ORDER — SODIUM CHLORIDE 9 MG/ML
5-250 INJECTION, SOLUTION INTRAVENOUS PRN
Status: CANCELLED | OUTPATIENT
Start: 2024-03-11

## 2024-03-11 RX ORDER — SODIUM CHLORIDE 0.9 % (FLUSH) 0.9 %
5-40 SYRINGE (ML) INJECTION PRN
Status: DISCONTINUED | OUTPATIENT
Start: 2024-03-11 | End: 2024-03-11

## 2024-03-11 RX ORDER — SODIUM CHLORIDE 9 MG/ML
5-250 INJECTION, SOLUTION INTRAVENOUS PRN
Status: DISCONTINUED | OUTPATIENT
Start: 2024-03-11 | End: 2024-03-11

## 2024-03-11 RX ORDER — SODIUM CHLORIDE 0.9 % (FLUSH) 0.9 %
5-40 SYRINGE (ML) INJECTION PRN
Status: CANCELLED | OUTPATIENT
Start: 2024-03-11

## 2024-03-11 RX ADMIN — SODIUM CHLORIDE 200 MG: 9 INJECTION, SOLUTION INTRAVENOUS at 14:02

## 2024-03-11 RX ADMIN — SODIUM CHLORIDE 200 ML/HR: 9 INJECTION, SOLUTION INTRAVENOUS at 14:01

## 2024-03-11 NOTE — PROGRESS NOTES
Patient ambulatory to department for four of five doses of venofer. Tolerated venofer infusion well with no adverse rx noted.Verbally told about most common possible side effects. Patient to follow up with Dr Kohler.

## 2024-03-12 ENCOUNTER — TELEPHONE (OUTPATIENT)
Dept: CARDIOLOGY CLINIC | Age: 74
End: 2024-03-12

## 2024-03-12 DIAGNOSIS — I50.42 CHRONIC COMBINED SYSTOLIC AND DIASTOLIC HEART FAILURE (HCC): ICD-10-CM

## 2024-03-12 DIAGNOSIS — D50.9 IRON DEFICIENCY ANEMIA, UNSPECIFIED IRON DEFICIENCY ANEMIA TYPE: Primary | ICD-10-CM

## 2024-03-12 DIAGNOSIS — I50.32 CHRONIC DIASTOLIC HEART FAILURE (HCC): ICD-10-CM

## 2024-03-12 NOTE — TELEPHONE ENCOUNTER
Pt has appt 4/4 with wak and asking if labs are needed prior. Pt finished treatmets but pt is still having bleeding and has GI appt 4/16. Please advise.

## 2024-03-13 NOTE — PROGRESS NOTES
may need to be adjusted.    PAF - 30 day monitor.  Continue Eliquis for now.  Ep referral to evaluate for Watchman candidacy.  Continue beta blocker at current dose    CAD s/p CABG - normal LVEF.  Zeb lack of chest pain can hold off on a stress test.  If he has new symptoms concerning for ischemia, will consider a CTA versus cath.  I tried to reassure him that his Lexiscan symptoms were so severe before since he had untreated mutlivessel CAD, but he wasn't interested in going through another Lexiscan  CHF - work on weight loss.  Consider GLP1- agonist.  Defer to endocrinology.  Continue aldactone, ACE, lasix at current doses.  Iron deficiency - getting colonoscopy soon    Summary of Assessment and Plan:   4/4/24    30 day holter   NO medication changes today   Continue risk factor modifications   Call for any new or worsening symptoms.     All new cardiac testing and lab results personally reviewed by me during this office visit and discussed with patient.    Patient counseled on lifestyle modification, diet, and exercise.    Follow Up: Return in about 6 months (around 10/4/2024).     NORMA Kohler MD  [facility]  Noninvasive and Adult Congenital Cardiologist  [phone]  [email or web address]    Scribe Attestation: This note was scribed in the presence of Dr. Kohler by Jolanta Naranjo, ESCOBAR.    Physician Attestation  The scribe wrote this note in the presence of me (Michael Kohler MD).  The scribe may have prepopulated components of this note with my historical  intellectual property under my direct supervision.  Any additions to this intellectual property were performed in my presence and at my direction.  Furthermore, the content and accuracy of this note have been reviewed by me with edits by me as needed.  Michael Kohler MD 4/5/2024 4:47 PM    Billing attestation for O/O E/M visit: I intend to provide longitudinal care of this patient's significant congestive diastolic heart failure.

## 2024-03-13 NOTE — TELEPHONE ENCOUNTER
Per Samira note, orders placed for labs prior to next appt. Please call pt to advise. Thank you!!

## 2024-03-13 NOTE — TELEPHONE ENCOUNTER
Spoke with Pt wife and advised WAK placed orders for labs and to have them drawn prior to next apt. Pt wife v/u.

## 2024-03-21 ENCOUNTER — TELEPHONE (OUTPATIENT)
Dept: CARDIOLOGY CLINIC | Age: 74
End: 2024-03-21

## 2024-03-21 NOTE — TELEPHONE ENCOUNTER
Wife states pt was taking eliquis 5 mg bid and was having blood in stool. Pt on his own stopped taking morning dose for one week and bleeding stopped. Pt resumed taking eliquis bid and started having blood in stool. Asking if it is ok to go back to taking eliquis once a day at night. Please call to advise.

## 2024-03-22 ENCOUNTER — HOSPITAL ENCOUNTER (OUTPATIENT)
Age: 74
Discharge: HOME | End: 2024-03-22
Payer: MEDICARE

## 2024-03-22 DIAGNOSIS — D50.9 IRON DEFICIENCY ANEMIA, UNSPECIFIED IRON DEFICIENCY ANEMIA TYPE: ICD-10-CM

## 2024-03-22 DIAGNOSIS — I50.42 CHRONIC COMBINED SYSTOLIC AND DIASTOLIC HEART FAILURE (HCC): ICD-10-CM

## 2024-03-22 DIAGNOSIS — I50.32 CHRONIC DIASTOLIC HEART FAILURE (HCC): ICD-10-CM

## 2024-03-22 LAB
ANION GAP SERPL CALCULATED.3IONS-SCNC: 12 MMOL/L (ref 3–16)
BUN SERPL-MCNC: 25 MG/DL (ref 7–20)
CALCIUM SERPL-MCNC: 10.1 MG/DL (ref 8.3–10.6)
CHLORIDE SERPL-SCNC: 100 MMOL/L (ref 99–110)
CO2 SERPL-SCNC: 25 MMOL/L (ref 21–32)
CREAT SERPL-MCNC: 0.8 MG/DL (ref 0.8–1.3)
DEPRECATED RDW RBC AUTO: 14.9 % (ref 12.4–15.4)
FERRITIN SERPL IA-MCNC: 292.5 NG/ML (ref 30–400)
GFR SERPLBLD CREATININE-BSD FMLA CKD-EPI: >60 ML/MIN/{1.73_M2}
GLUCOSE SERPL-MCNC: 203 MG/DL (ref 70–99)
HCT VFR BLD AUTO: 38.4 % (ref 40.5–52.5)
HGB BLD-MCNC: 13.2 G/DL (ref 13.5–17.5)
IRON SATN MFR SERPL: 23 % (ref 20–50)
IRON SERPL-MCNC: 75 UG/DL (ref 59–158)
MCH RBC QN AUTO: 28.4 PG (ref 26–34)
MCHC RBC AUTO-ENTMCNC: 34.3 G/DL (ref 31–36)
MCV RBC AUTO: 83 FL (ref 80–100)
NT-PROBNP SERPL-MCNC: 238 PG/ML (ref 0–124)
PLATELET # BLD AUTO: 238 K/UL (ref 135–450)
PMV BLD AUTO: 8.6 FL (ref 5–10.5)
POTASSIUM SERPL-SCNC: 4.6 MMOL/L (ref 3.5–5.1)
RBC # BLD AUTO: 4.63 M/UL (ref 4.2–5.9)
SODIUM SERPL-SCNC: 137 MMOL/L (ref 136–145)
TIBC SERPL-MCNC: 325 UG/DL (ref 260–445)
WBC # BLD AUTO: 9.1 K/UL (ref 4–11)

## 2024-03-22 PROCEDURE — 36415 COLL VENOUS BLD VENIPUNCTURE: CPT

## 2024-03-22 PROCEDURE — 82728 ASSAY OF FERRITIN: CPT

## 2024-03-22 PROCEDURE — 83550 IRON BINDING TEST: CPT

## 2024-03-22 PROCEDURE — 80048 BASIC METABOLIC PNL TOTAL CA: CPT

## 2024-03-22 PROCEDURE — 83540 ASSAY OF IRON: CPT

## 2024-03-22 PROCEDURE — 83880 ASSAY OF NATRIURETIC PEPTIDE: CPT

## 2024-03-22 PROCEDURE — 85027 COMPLETE CBC AUTOMATED: CPT

## 2024-03-22 NOTE — TELEPHONE ENCOUNTER
Pt wife called back. I relayed the message below. She v/u and requested to send new dose to pt's pharmacy. I pend the med, please sign the orders  Thank you

## 2024-03-22 NOTE — TELEPHONE ENCOUNTER
Per WAK - Decrease Eliquis dose to 2.5mg BID.      Med management updated to reflect this.      I called Jil to notify.  No answer, voicemail left.  Requested a return call back to discuss.

## 2024-03-26 ENCOUNTER — TELEPHONE (OUTPATIENT)
Dept: CARDIOLOGY CLINIC | Age: 74
End: 2024-03-26

## 2024-03-26 NOTE — TELEPHONE ENCOUNTER
----- Message from Michael Kohler MD sent at 3/25/2024 12:40 AM EDT -----  Labs are good  Doesn't meet criteria for IV iron

## 2024-03-27 PROBLEM — Z98.890 S/P ABLATION OF ATRIAL FLUTTER: Status: ACTIVE | Noted: 2024-03-27

## 2024-03-27 PROBLEM — Z95.1 S/P CABG (CORONARY ARTERY BYPASS GRAFT): Status: ACTIVE | Noted: 2024-03-27

## 2024-03-27 PROBLEM — G47.33 OSA (OBSTRUCTIVE SLEEP APNEA): Status: ACTIVE | Noted: 2024-03-27

## 2024-03-27 PROBLEM — Z86.79 S/P ABLATION OF ATRIAL FLUTTER: Status: ACTIVE | Noted: 2024-03-27

## 2024-03-27 PROBLEM — I71.21 ANEURYSM OF ASCENDING AORTA WITHOUT RUPTURE (HCC): Status: ACTIVE | Noted: 2024-03-27

## 2024-03-27 PROBLEM — E66.01 MORBID OBESITY WITH BMI OF 40.0-44.9, ADULT (HCC): Status: ACTIVE | Noted: 2024-03-27

## 2024-03-27 PROBLEM — I50.42 CHRONIC COMBINED SYSTOLIC AND DIASTOLIC HEART FAILURE (HCC): Status: ACTIVE | Noted: 2024-03-27

## 2024-03-27 PROBLEM — I25.2 OLD MI (MYOCARDIAL INFARCTION): Status: ACTIVE | Noted: 2024-03-27

## 2024-03-27 PROBLEM — E78.2 MIXED HYPERLIPIDEMIA DUE TO TYPE 2 DIABETES MELLITUS (HCC): Status: ACTIVE | Noted: 2024-03-27

## 2024-03-27 PROBLEM — I25.10 CAD IN NATIVE ARTERY: Status: ACTIVE | Noted: 2024-03-27

## 2024-03-27 PROBLEM — I50.32 CHRONIC DIASTOLIC HEART FAILURE (HCC): Status: ACTIVE | Noted: 2024-03-27

## 2024-03-27 PROBLEM — I48.0 PAF (PAROXYSMAL ATRIAL FIBRILLATION) (HCC): Status: ACTIVE | Noted: 2024-03-27

## 2024-03-27 PROBLEM — E11.69 MIXED HYPERLIPIDEMIA DUE TO TYPE 2 DIABETES MELLITUS (HCC): Status: ACTIVE | Noted: 2024-03-27

## 2024-04-04 ENCOUNTER — OFFICE VISIT (OUTPATIENT)
Dept: CARDIOLOGY CLINIC | Age: 74
End: 2024-04-04

## 2024-04-04 VITALS
HEIGHT: 71 IN | WEIGHT: 308.4 LBS | DIASTOLIC BLOOD PRESSURE: 62 MMHG | BODY MASS INDEX: 43.18 KG/M2 | HEART RATE: 74 BPM | SYSTOLIC BLOOD PRESSURE: 92 MMHG | OXYGEN SATURATION: 94 %

## 2024-04-04 DIAGNOSIS — G47.33 OSA (OBSTRUCTIVE SLEEP APNEA): ICD-10-CM

## 2024-04-04 DIAGNOSIS — D50.9 IRON DEFICIENCY ANEMIA, UNSPECIFIED IRON DEFICIENCY ANEMIA TYPE: ICD-10-CM

## 2024-04-04 DIAGNOSIS — Z79.4 TYPE 2 DIABETES MELLITUS WITH DIABETIC POLYNEUROPATHY, WITH LONG-TERM CURRENT USE OF INSULIN (HCC): ICD-10-CM

## 2024-04-04 DIAGNOSIS — I25.2 OLD MI (MYOCARDIAL INFARCTION): ICD-10-CM

## 2024-04-04 DIAGNOSIS — I48.3 TYPICAL ATRIAL FLUTTER (HCC): ICD-10-CM

## 2024-04-04 DIAGNOSIS — I48.0 PAF (PAROXYSMAL ATRIAL FIBRILLATION) (HCC): Primary | ICD-10-CM

## 2024-04-04 DIAGNOSIS — I25.10 CAD IN NATIVE ARTERY: ICD-10-CM

## 2024-04-04 DIAGNOSIS — Z98.890 S/P ABLATION OF ATRIAL FLUTTER: ICD-10-CM

## 2024-04-04 DIAGNOSIS — E66.01 MORBID OBESITY WITH BMI OF 40.0-44.9, ADULT (HCC): ICD-10-CM

## 2024-04-04 DIAGNOSIS — Z86.79 S/P ABLATION OF ATRIAL FLUTTER: ICD-10-CM

## 2024-04-04 DIAGNOSIS — E11.42 TYPE 2 DIABETES MELLITUS WITH DIABETIC POLYNEUROPATHY, WITH LONG-TERM CURRENT USE OF INSULIN (HCC): ICD-10-CM

## 2024-04-04 DIAGNOSIS — E78.2 MIXED HYPERLIPIDEMIA DUE TO TYPE 2 DIABETES MELLITUS (HCC): ICD-10-CM

## 2024-04-04 DIAGNOSIS — I50.42 CHRONIC COMBINED SYSTOLIC AND DIASTOLIC HEART FAILURE (HCC): ICD-10-CM

## 2024-04-04 DIAGNOSIS — Z95.1 S/P CABG (CORONARY ARTERY BYPASS GRAFT): ICD-10-CM

## 2024-04-04 DIAGNOSIS — I50.32 CHRONIC DIASTOLIC HEART FAILURE (HCC): ICD-10-CM

## 2024-04-04 DIAGNOSIS — E11.69 MIXED HYPERLIPIDEMIA DUE TO TYPE 2 DIABETES MELLITUS (HCC): ICD-10-CM

## 2024-04-04 DIAGNOSIS — I71.21 ANEURYSM OF ASCENDING AORTA WITHOUT RUPTURE (HCC): ICD-10-CM

## 2024-04-04 NOTE — PATIENT INSTRUCTIONS
Follow up with Dr Kohler in 6 months     Follow up with EP for Afib and watchman     Follow up with PCP about weight loss medicines     Call for any questions or concerns.

## 2024-04-05 ENCOUNTER — TELEPHONE (OUTPATIENT)
Dept: CARDIOLOGY CLINIC | Age: 74
End: 2024-04-05

## 2024-04-05 NOTE — TELEPHONE ENCOUNTER
Wife states the holter monitor is not sticking. Pt does not want to mess with any more and wants to return it. They are calling [facility].    Wife states they received [employer] and states they spoke with someone yesterday with [employer] office who said they would help them set it up the gómez on his phone. Asking if they can come in today to have them help. Please call to advise.

## 2024-04-05 NOTE — TELEPHONE ENCOUNTER
Please call his wife and tell her he can buy a [employer] Mobile device to track heart rates and rhythm if he wishes. They cost around $100, I think.  Thank you.

## 2024-04-05 NOTE — TELEPHONE ENCOUNTER
Spoke to pt wife. I told her they can call [facility] [employer]er service helpline. They can help them setting up everything. She v/u.

## 2024-04-05 NOTE — TELEPHONE ENCOUNTER
Pt already got [email or web address] mobile device from [employer].  You1 hour ago (9:43 AM)     SD  Spoke to pt wife. I told her they can call [employer] customer service helpline. They can help them setting up everything. She v/u.

## 2024-04-09 ENCOUNTER — OFFICE VISIT (OUTPATIENT)
Dept: ORTHOPEDIC SURGERY | Age: 74
End: 2024-04-09

## 2024-04-09 VITALS — BODY MASS INDEX: 43.12 KG/M2 | WEIGHT: 308 LBS | HEIGHT: 71 IN | RESPIRATION RATE: 17 BRPM

## 2024-04-09 DIAGNOSIS — M12.561 TRAUMATIC ARTHROPATHY OF RIGHT KNEE: ICD-10-CM

## 2024-04-09 DIAGNOSIS — M12.562 TRAUMATIC ARTHROPATHY OF LEFT KNEE: Primary | ICD-10-CM

## 2024-04-10 NOTE — PROGRESS NOTES
soft tissues without significant swelling or erythema.  The overall alignment of the knee is neutral.  There is minimal intra-articular effusion.  AROM     Extension 0     Flexion  115   There is mild discomfort associated with ROM testing.   Pes anserine bursa non-tender to palpation.  Patellar tendon non-tender to palpation.  Quadriceps tendon non-tender to palpation.  Collateral ligaments non-tender to palpation.  Popliteal fossa non-tender to palpation.  Retro patellar crepitus is not present.   There is no instability with varus/valgus stress testing in full extension or 90 degrees of flexion.  There is no instability with anterior drawer testing.  Extensor Mechanism is intact.           X Rays: not performed in the office today:       Diagnosis:       ICD-10-CM    1. Traumatic arthropathy of left knee  M12.562       2. Traumatic arthropathy of right knee  M12.561            Assessment and Plan:       Assessment:  Clinically stable left and right knee arthroplasty.    I had an extensive discussion with Mr. Jason Kenney regarding the natural history, etiology, and long term consequences of his condition. I have presented reasonable alternatives to the patient's proposed care, treatment, and services.  Risks and benefits of the treatment options also reviewed in detail. I have outlined a treatment plan with them. He has had full opportunity to ask his questions.  I have answered them all to his satisfaction.  I feel that Mr. Jason Kenney understands our discussion today.     Plan:      Follow up- 6 months for x rays of the left and right knee.   Call or return to clinic if these symptoms worsen or fail to improve as anticipated.                                               Jasper Valenzuela PA-C   Senior Physician Assistant   [facility]                                         Disclaimer:  This note was generated with use of a verbal recognition program (DRAGON) and an attempt

## 2024-04-18 ENCOUNTER — TELEPHONE (OUTPATIENT)
Dept: CARDIOLOGY CLINIC | Age: 74
End: 2024-04-18

## 2024-04-18 NOTE — TELEPHONE ENCOUNTER
Got an email from Juan Manuel Caro regarding pt monitor billing    This is to let you know it’s been approved and it’s been written off as Medical Exemption. No charge or bill for patient.    called pt for the message above. No answer, lmom to call back.

## 2024-04-18 NOTE — TELEPHONE ENCOUNTER
Called pt relayed the message below. Pt said he was supposed to wear monitor for 30 days but it keep falling so he took it off the same day and returned it back next day. He called and spoke to [facility] associate they told him he wont get charge for anything.   I called at [facility] to make sure he wont get charge, associate stated she is going to open the case and will see if they are going to charge or not.  I called dana Caro, relayed the message above she said she is not in billing and send a mail to [employer]. I send the mail to [facility].

## 2024-04-18 NOTE — TELEPHONE ENCOUNTER
----- Message from Michael Kohler MD sent at 4/17/2024  4:36 PM EDT -----  Monitor [facility]

## 2024-05-07 RX ORDER — ROSUVASTATIN CALCIUM 20 MG/1
20 TABLET, COATED ORAL DAILY
Qty: 30 TABLET | Refills: 11 | Status: SHIPPED | OUTPATIENT
Start: 2024-05-07

## 2024-05-07 NOTE — TELEPHONE ENCOUNTER
Medication Refill    Medication needing refilled: rosuvastatin (CRESTOR) 20 MG tablet     Dosage of the medication: 20 mg    How are you taking this medication (QD, BID, TID, QID, PRN): 1 a day    30 or 90 day supply called in: 90    When will you run out of your medication: 7 ddays    Which Pharmacy are we sending the medication to?:      [facility] [id number] - [place] [address] [address] - P [phone] - F [phone]  [address] [facility] [address]  Phone: [phone]  Fax: [phone]         Original Prescriber is Historical. Pt requesting WAK take over this med. Please advise. Thank you.

## 2024-05-07 NOTE — TELEPHONE ENCOUNTER
Last OV: 04/04/2024 LISA  Last labs/ EKG: Lipid 12/22/2023 Care Everywhere  Appt scheduled : 07/26/2024 TAWNY      **Original Prescriber is Historical. Pt requesting LISA take over this med. Please advise. Thank you.

## 2024-06-07 RX ORDER — LISINOPRIL 2.5 MG/1
TABLET ORAL
Qty: 90 TABLET | Refills: 3 | Status: SHIPPED | OUTPATIENT
Start: 2024-06-07

## 2024-06-07 NOTE — TELEPHONE ENCOUNTER
Medication Refill    Medication needing refilled:  lisinopril     Dosage of the medication:  2.5mg    How are you taking this medication (QD, BID, TID, QID, PRN):  Take 1 tablet daily    30 or 90 day supply called in:  90    When will you run out of your medication:    Which Pharmacy are we sending the medication to?:    [facility] [id number]  [address] [place] [address]  Phone: [phone]  Fax: [phone]

## 2024-06-12 NOTE — PROGRESS NOTES
Patient reached ____ yes  ____x_ no   VM instructions left ____ yes   phone number ________                                ____ no-office notified          Date __6/19_______  Time 1145_______  Arrival _1015__/per office___    Nothing to eat or drink after midnight-follow your doctors prep instructions-this may include taking a second dose of your prep after midnight  Responsible adult 18 or older to stay on site while you are here-drive you home-stay with you after  Follow any instructions your doctors office has given you  Bring a complete list of all your medications and supplements including name,dose,how often taken the day of your procedure  If you normally take the following medications in the morning please do so the AM of your procedure with a small sip of water       Heart,blood pressure,seizure,thyroid or breathing medications-use your inhalers-bring any rescue inhalers with you DOS       DO NOT take blood pressure medications ending in \"moriah\" or \"pril\" the AM of procedure or evening prior  Dr Duron patients are not to take any medications the AM of surgery  Take half or your normal dose of any long acting insulins the night before your procedure-do not take any diabetic medications the AM of procedure  Follow your doctors instructions regarding stopping or taking  any blood thinners-if you do not have instructions-call them  Any questions call your doctor  Other ______instructed to get instructions on ELIQUIS to take metoprol and thyroid AM of and 1/2 dose insulin chanel prior________________________________________________________                VISITOR POLICY(subject to change)             The current policy is 2 visitors per patient.There are no children allowed.Mask at discretion of facility. Visiting hours are 8a-8p.Overnight visitors will be at the discretion of the nurse. All policies are subject to change.

## 2024-06-19 ENCOUNTER — ANESTHESIA EVENT (OUTPATIENT)
Dept: ENDOSCOPY | Age: 74
End: 2024-06-19
Payer: MEDICARE

## 2024-06-19 ENCOUNTER — ANESTHESIA (OUTPATIENT)
Dept: ENDOSCOPY | Age: 74
End: 2024-06-19
Payer: MEDICARE

## 2024-06-19 ENCOUNTER — HOSPITAL ENCOUNTER (OUTPATIENT)
Age: 74
Setting detail: OUTPATIENT SURGERY
Discharge: HOME | End: 2024-06-19
Attending: INTERNAL MEDICINE | Admitting: INTERNAL MEDICINE
Payer: MEDICARE

## 2024-06-19 VITALS
HEART RATE: 75 BPM | SYSTOLIC BLOOD PRESSURE: 115 MMHG | RESPIRATION RATE: 21 BRPM | BODY MASS INDEX: 43.1 KG/M2 | TEMPERATURE: 96.8 F | DIASTOLIC BLOOD PRESSURE: 78 MMHG | OXYGEN SATURATION: 94 % | WEIGHT: 309 LBS

## 2024-06-19 DIAGNOSIS — R13.10 DYSPHAGIA, UNSPECIFIED TYPE: ICD-10-CM

## 2024-06-19 LAB
GLUCOSE BLD-MCNC: 250 MG/DL (ref 70–99)
PERFORMED ON: ABNORMAL

## 2024-06-19 PROCEDURE — 88305 TISSUE EXAM BY PATHOLOGIST: CPT

## 2024-06-19 PROCEDURE — 7100000000 HC PACU RECOVERY - FIRST 15 MIN: Performed by: INTERNAL MEDICINE

## 2024-06-19 PROCEDURE — 3609012400 HC EGD TRANSORAL BIOPSY SINGLE/MULTIPLE: Performed by: INTERNAL MEDICINE

## 2024-06-19 PROCEDURE — 6360000002 HC RX W HCPCS: Performed by: NURSE ANESTHETIST, CERTIFIED REGISTERED

## 2024-06-19 PROCEDURE — 2709999900 HC NON-CHARGEABLE SUPPLY: Performed by: INTERNAL MEDICINE

## 2024-06-19 PROCEDURE — 3609015300 HC ESOPHAGEAL DILATION MALONEY: Performed by: INTERNAL MEDICINE

## 2024-06-19 PROCEDURE — 3700000000 HC ANESTHESIA ATTENDED CARE: Performed by: INTERNAL MEDICINE

## 2024-06-19 PROCEDURE — 2500000003 HC RX 250 WO HCPCS: Performed by: NURSE ANESTHETIST, CERTIFIED REGISTERED

## 2024-06-19 PROCEDURE — 7100000011 HC PHASE II RECOVERY - ADDTL 15 MIN: Performed by: INTERNAL MEDICINE

## 2024-06-19 PROCEDURE — 7100000001 HC PACU RECOVERY - ADDTL 15 MIN: Performed by: INTERNAL MEDICINE

## 2024-06-19 PROCEDURE — 3700000001 HC ADD 15 MINUTES (ANESTHESIA): Performed by: INTERNAL MEDICINE

## 2024-06-19 PROCEDURE — 2580000003 HC RX 258: Performed by: ANESTHESIOLOGY

## 2024-06-19 PROCEDURE — 7100000010 HC PHASE II RECOVERY - FIRST 15 MIN: Performed by: INTERNAL MEDICINE

## 2024-06-19 RX ORDER — LIDOCAINE HYDROCHLORIDE 20 MG/ML
INJECTION, SOLUTION INFILTRATION; PERINEURAL PRN
Status: DISCONTINUED | OUTPATIENT
Start: 2024-06-19 | End: 2024-06-19 | Stop reason: SDUPTHER

## 2024-06-19 RX ORDER — PROPOFOL 10 MG/ML
INJECTION, EMULSION INTRAVENOUS CONTINUOUS PRN
Status: DISCONTINUED | OUTPATIENT
Start: 2024-06-19 | End: 2024-06-19 | Stop reason: SDUPTHER

## 2024-06-19 RX ORDER — MIDAZOLAM HYDROCHLORIDE 1 MG/ML
INJECTION INTRAMUSCULAR; INTRAVENOUS PRN
Status: DISCONTINUED | OUTPATIENT
Start: 2024-06-19 | End: 2024-06-19 | Stop reason: SDUPTHER

## 2024-06-19 RX ORDER — SODIUM CHLORIDE 9 MG/ML
INJECTION, SOLUTION INTRAVENOUS CONTINUOUS
Status: DISCONTINUED | OUTPATIENT
Start: 2024-06-19 | End: 2024-06-19 | Stop reason: HOSPADM

## 2024-06-19 RX ADMIN — SODIUM CHLORIDE: 9 INJECTION, SOLUTION INTRAVENOUS at 10:39

## 2024-06-19 RX ADMIN — MIDAZOLAM 2 MG: 1 INJECTION INTRAMUSCULAR; INTRAVENOUS at 11:45

## 2024-06-19 RX ADMIN — PROPOFOL 120 MCG/KG/MIN: 10 INJECTION, EMULSION INTRAVENOUS at 11:46

## 2024-06-19 RX ADMIN — LIDOCAINE HYDROCHLORIDE 100 MG: 20 INJECTION, SOLUTION INFILTRATION; PERINEURAL at 11:46

## 2024-06-19 ASSESSMENT — COPD QUESTIONNAIRES: CAT_SEVERITY: MODERATE

## 2024-06-19 ASSESSMENT — PAIN - FUNCTIONAL ASSESSMENT: PAIN_FUNCTIONAL_ASSESSMENT: 0-10

## 2024-06-19 ASSESSMENT — LIFESTYLE VARIABLES: SMOKING_STATUS: 0

## 2024-06-19 NOTE — PROGRESS NOTES
Discharge instructions reviewed with pt & wife Jil. Understanding verbalized. Written copy to pt.

## 2024-06-19 NOTE — ANESTHESIA POSTPROCEDURE EVALUATION
Department of Anesthesiology  Postprocedure Note    Patient: Jason Kenney  MRN: [id number]  YOB: 1950  Date of evaluation: 6/19/2024    Procedure Summary       Date: 06/19/24 Room / Location: [place] [address] / [facility]    Anesthesia Start: 1140 Anesthesia Stop: 1201    Procedures:       ESOPHAGOGASTRODUODENOSCOPY BIOPSY (Abdomen)      ESOPHAGEAL DILATION ALBER Diagnosis:       Dysphagia, unspecified type      (Dysphagia, unspecified type [R13.10])    Surgeons: Sushil Manuel MD Responsible Provider: PEDRO Valdovinos MD    Anesthesia Type: MAC ASA Status: 3            Anesthesia Type: No value filed.    Marge Phase I: Marge Score: 10    Marge Phase II:      Anesthesia Post Evaluation    Patient location during evaluation: PACU  Patient participation: complete - patient participated  Level of consciousness: awake and alert  Pain score: 0  Airway patency: patent  Nausea & Vomiting: no nausea  Cardiovascular status: blood pressure returned to baseline  Respiratory status: acceptable  Hydration status: euvolemic  Pain management: adequate    No notable events documented.

## 2024-06-19 NOTE — H&P
spironolactone (ALDACTONE) 25 MG tablet Oral for 30 Days      furosemide (LASIX) 40 MG tablet Take 1 tablet by mouth daily      metoprolol tartrate (LOPRESSOR) 25 MG tablet Taking 50 mg twice a day      magnesium oxide (MAG-OX) 400 MG tablet Take 1 tablet by mouth daily      vitamin B-12 (CYANOCOBALAMIN) 1000 MCG tablet Take 1 tablet by mouth daily      metFORMIN (GLUCOPHAGE) 1000 MG tablet Take 1 tablet by mouth 2 times daily (with meals)      levothyroxine (SYNTHROID) 25 MCG tablet Take 1 tablet by mouth Daily (Patient taking differently: Take 88 mcg by mouth Daily) 30 tablet 1        Allergies:  Amoxicillin-pot clavulanate, Statins, Tramadol, and Vicodin [hydrocodone-acetaminophen]      Social History:   Social History     Tobacco Use    Smoking status: Former     Current packs/day: 0.00     Average packs/day: 1 pack/day for 20.0 years (20.0 ttl pk-yrs)     Types: Cigarettes     Start date: 1992     Quit date: 2012     Years since quittin.0    Smokeless tobacco: Never    Tobacco comments:     quit   Substance Use Topics    Alcohol use: No     Family History:   Family History   Problem Relation Age of Onset    Cancer Father     Arthritis Other     Cancer Other     Diabetes Other     Heart Disease Other     High Blood Pressure Other        PHYSICAL EXAM:      /85   Pulse 75   Temp 97.8 °F (36.6 °C) (Temporal)   Resp 20   Wt (!) 140.2 kg (309 lb)   SpO2 97%   BMI 43.10 kg/m²  I        Heart:  RRR, normal s1s2    Lungs:  CTA and normal effort    Abdomen:   Soft, nt nd.         ASSESSMENT AND PLAN:    1.  Patient is a 74 y.o. male here for endoscopy with MAC sedation.    2.  Procedure options, risks and benefits reviewed with patient and/or guardian.  They express understanding.

## 2024-06-19 NOTE — ANESTHESIA PRE PROCEDURE
Department of Anesthesiology  Preprocedure Note       Name:  Jason Kenney   Age:  74 y.o.  :  1950                                          MRN:  [id number]         Date:  2024      Surgeon: Surgeon(s):  Sushil Manuel MD    Procedure: Procedure(s):  ESOPHAGOGASTRODUODENOSCOPY DIAGNOSTIC ONLY    Medications prior to admission:   Prior to Admission medications    Medication Sig Start Date End Date Taking? Authorizing Provider   lisinopril (PRINIVIL;ZESTRIL) 2.5 MG tablet Oral for 30 Days 24   Michael Kohler MD   rosuvastatin (CRESTOR) 20 MG tablet Take 1 tablet by mouth daily 24   Michael Kohler MD   insulin NPH (HUMULIN N;NOVOLIN N) 100 UNIT/ML injection vial Inject into the skin 23   Cheryl Love MD   apixaban (ELIQUIS) 2.5 MG TABS tablet Take 1 tablet by mouth 2 times daily 3/22/24   Michael Kohler MD   ferrous sulfate (IRON 325) 325 (65 Fe) MG tablet Take 1 tablet by mouth daily (with breakfast)    Cheryl Love MD   spironolactone (ALDACTONE) 25 MG tablet Oral for 30 Days 23   Cheryl Love MD   furosemide (LASIX) 40 MG tablet Take 1 tablet by mouth daily 10/21/22   Cheryl Love MD   metoprolol tartrate (LOPRESSOR) 25 MG tablet Taking 50 mg twice a day 19   Cheryl Love MD   magnesium oxide (MAG-OX) 400 MG tablet Take 1 tablet by mouth daily    Cheryl Love MD   vitamin B-12 (CYANOCOBALAMIN) 1000 MCG tablet Take 1 tablet by mouth daily    Cheryl Love MD   metFORMIN (GLUCOPHAGE) 1000 MG tablet Take 1 tablet by mouth 2 times daily (with meals)    Cheryl Love MD   levothyroxine (SYNTHROID) 25 MCG tablet Take 1 tablet by mouth Daily  Patient taking differently: Take 88 mcg by mouth Daily 5/20/15   Gus Gamble MD       Current medications:    Current Facility-Administered Medications   Medication Dose Route Frequency Provider Last Rate Last Admin   • 0.9 % sodium chloride infusion

## 2024-06-19 NOTE — PROGRESS NOTES
Pt discharged via wheelchair. Pt discharged with instructions and all belongings. Pt wife taking stable pt home.

## 2024-07-01 RX ORDER — SPIRONOLACTONE 25 MG/1
25 TABLET ORAL DAILY
Qty: 90 TABLET | Refills: 0 | Status: SHIPPED | OUTPATIENT
Start: 2024-07-01

## 2024-07-01 NOTE — TELEPHONE ENCOUNTER
Medication Refill    Medication needing refilled:  spironolactone (ALDACTONE) 25 MG tablet     Dosage of the medication:  Take 1 tablet by mouth daily     How are you taking this medication (QD, BID, TID, QID, PRN):    30 or 90 day supply called in:  90 days    When will you run out of your medication:  Pt has 5 days left    Which Pharmacy are we sending the medication to?:  [facility] [id number] [place] [address] [address] P [phone] - F [phone]

## 2024-07-09 ENCOUNTER — APPOINTMENT (OUTPATIENT)
Dept: GENERAL RADIOLOGY | Age: 74
End: 2024-07-09
Payer: MEDICARE

## 2024-07-09 ENCOUNTER — HOSPITAL ENCOUNTER (EMERGENCY)
Age: 74
Discharge: HOME | End: 2024-07-09
Payer: MEDICARE

## 2024-07-09 ENCOUNTER — APPOINTMENT (OUTPATIENT)
Dept: CT IMAGING | Age: 74
End: 2024-07-09
Payer: MEDICARE

## 2024-07-09 VITALS
TEMPERATURE: 97.8 F | WEIGHT: 310 LBS | HEIGHT: 71 IN | RESPIRATION RATE: 20 BRPM | HEART RATE: 57 BPM | DIASTOLIC BLOOD PRESSURE: 72 MMHG | OXYGEN SATURATION: 95 % | BODY MASS INDEX: 43.4 KG/M2 | SYSTOLIC BLOOD PRESSURE: 116 MMHG

## 2024-07-09 DIAGNOSIS — R53.83 OTHER FATIGUE: ICD-10-CM

## 2024-07-09 DIAGNOSIS — R11.0 NAUSEA: ICD-10-CM

## 2024-07-09 DIAGNOSIS — K62.5 RECTAL BLEEDING: Primary | ICD-10-CM

## 2024-07-09 DIAGNOSIS — R10.31 ABDOMINAL PAIN, RIGHT LOWER QUADRANT: ICD-10-CM

## 2024-07-09 LAB
ABO + RH BLD: NORMAL
ALBUMIN SERPL-MCNC: 4.5 G/DL (ref 3.4–5)
ALBUMIN/GLOB SERPL: 1.3 {RATIO} (ref 1.1–2.2)
ALP SERPL-CCNC: 68 U/L (ref 40–129)
ALT SERPL-CCNC: 14 U/L (ref 10–40)
ANION GAP SERPL CALCULATED.3IONS-SCNC: 13 MMOL/L (ref 3–16)
APTT BLD: 36.1 SEC (ref 22.1–36.4)
AST SERPL-CCNC: 15 U/L (ref 15–37)
BASOPHILS # BLD: 0.1 K/UL (ref 0–0.2)
BASOPHILS NFR BLD: 1.3 %
BILIRUB SERPL-MCNC: 1.2 MG/DL (ref 0–1)
BILIRUB UR QL STRIP.AUTO: NEGATIVE
BLD GP AB SCN SERPL QL: NORMAL
BUN SERPL-MCNC: 24 MG/DL (ref 7–20)
CALCIUM SERPL-MCNC: 10.4 MG/DL (ref 8.3–10.6)
CHLORIDE SERPL-SCNC: 99 MMOL/L (ref 99–110)
CLARITY UR: CLEAR
CO2 SERPL-SCNC: 24 MMOL/L (ref 21–32)
COLOR UR: YELLOW
CREAT SERPL-MCNC: 0.7 MG/DL (ref 0.8–1.3)
DEPRECATED RDW RBC AUTO: 14.2 % (ref 12.4–15.4)
EKG ATRIAL RATE: 67 BPM
EKG DIAGNOSIS: NORMAL
EKG P AXIS: 46 DEGREES
EKG P-R INTERVAL: 234 MS
EKG Q-T INTERVAL: 392 MS
EKG QRS DURATION: 102 MS
EKG QTC CALCULATION (BAZETT): 414 MS
EKG R AXIS: -51 DEGREES
EKG T AXIS: 46 DEGREES
EKG VENTRICULAR RATE: 67 BPM
EOSINOPHIL # BLD: 0.7 K/UL (ref 0–0.6)
EOSINOPHIL NFR BLD: 8.2 %
GFR SERPLBLD CREATININE-BSD FMLA CKD-EPI: >90 ML/MIN/{1.73_M2}
GLUCOSE SERPL-MCNC: 194 MG/DL (ref 70–99)
GLUCOSE UR STRIP.AUTO-MCNC: NEGATIVE MG/DL
HCT VFR BLD AUTO: 41 % (ref 40.5–52.5)
HEMOCCULT STL QL: ABNORMAL
HGB BLD-MCNC: 13.6 G/DL (ref 13.5–17.5)
HGB UR QL STRIP.AUTO: NEGATIVE
INR PPP: 1.13 (ref 0.85–1.15)
KETONES UR STRIP.AUTO-MCNC: NEGATIVE MG/DL
LEUKOCYTE ESTERASE UR QL STRIP.AUTO: NEGATIVE
LIPASE SERPL-CCNC: 29 U/L (ref 13–60)
LYMPHOCYTES # BLD: 2 K/UL (ref 1–5.1)
LYMPHOCYTES NFR BLD: 23.7 %
MCH RBC QN AUTO: 27.3 PG (ref 26–34)
MCHC RBC AUTO-ENTMCNC: 33.1 G/DL (ref 31–36)
MCV RBC AUTO: 82.4 FL (ref 80–100)
MONOCYTES # BLD: 0.6 K/UL (ref 0–1.3)
MONOCYTES NFR BLD: 6.6 %
NEUTROPHILS # BLD: 5.1 K/UL (ref 1.7–7.7)
NEUTROPHILS NFR BLD: 60.2 %
NITRITE UR QL STRIP.AUTO: NEGATIVE
NT-PROBNP SERPL-MCNC: 350 PG/ML (ref 0–124)
PH UR STRIP.AUTO: 5 [PH] (ref 5–8)
PLATELET # BLD AUTO: 273 K/UL (ref 135–450)
PMV BLD AUTO: 8.5 FL (ref 5–10.5)
POTASSIUM SERPL-SCNC: 5.1 MMOL/L (ref 3.5–5.1)
PROT SERPL-MCNC: 8 G/DL (ref 6.4–8.2)
PROT UR STRIP.AUTO-MCNC: NEGATIVE MG/DL
PROTHROMBIN TIME: 14.7 SEC (ref 11.9–14.9)
RBC # BLD AUTO: 4.98 M/UL (ref 4.2–5.9)
SODIUM SERPL-SCNC: 136 MMOL/L (ref 136–145)
SP GR UR STRIP.AUTO: <=1.005 (ref 1–1.03)
TROPONIN, HIGH SENSITIVITY: 11 NG/L (ref 0–22)
TROPONIN, HIGH SENSITIVITY: 15 NG/L (ref 0–22)
UA COMPLETE W REFLEX CULTURE PNL UR: NORMAL
UA DIPSTICK W REFLEX MICRO PNL UR: NORMAL
URN SPEC COLLECT METH UR: NORMAL
UROBILINOGEN UR STRIP-ACNC: 0.2 E.U./DL
WBC # BLD AUTO: 8.5 K/UL (ref 4–11)

## 2024-07-09 PROCEDURE — 86850 RBC ANTIBODY SCREEN: CPT

## 2024-07-09 PROCEDURE — 71045 X-RAY EXAM CHEST 1 VIEW: CPT

## 2024-07-09 PROCEDURE — 93010 ELECTROCARDIOGRAM REPORT: CPT | Performed by: INTERNAL MEDICINE

## 2024-07-09 PROCEDURE — 80053 COMPREHEN METABOLIC PANEL: CPT

## 2024-07-09 PROCEDURE — 99285 EMERGENCY DEPT VISIT HI MDM: CPT

## 2024-07-09 PROCEDURE — 74174 CTA ABD&PLVS W/CONTRAST: CPT

## 2024-07-09 PROCEDURE — 82270 OCCULT BLOOD FECES: CPT

## 2024-07-09 PROCEDURE — 84484 ASSAY OF TROPONIN QUANT: CPT

## 2024-07-09 PROCEDURE — 81003 URINALYSIS AUTO W/O SCOPE: CPT

## 2024-07-09 PROCEDURE — 85610 PROTHROMBIN TIME: CPT

## 2024-07-09 PROCEDURE — 85730 THROMBOPLASTIN TIME PARTIAL: CPT

## 2024-07-09 PROCEDURE — 85025 COMPLETE CBC W/AUTO DIFF WBC: CPT

## 2024-07-09 PROCEDURE — 86900 BLOOD TYPING SEROLOGIC ABO: CPT

## 2024-07-09 PROCEDURE — 86901 BLOOD TYPING SEROLOGIC RH(D): CPT

## 2024-07-09 PROCEDURE — 6360000004 HC RX CONTRAST MEDICATION: Performed by: PHYSICIAN ASSISTANT

## 2024-07-09 PROCEDURE — 83690 ASSAY OF LIPASE: CPT

## 2024-07-09 PROCEDURE — 83880 ASSAY OF NATRIURETIC PEPTIDE: CPT

## 2024-07-09 PROCEDURE — 93005 ELECTROCARDIOGRAM TRACING: CPT | Performed by: PHYSICIAN ASSISTANT

## 2024-07-09 RX ORDER — ONDANSETRON 2 MG/ML
4 INJECTION INTRAMUSCULAR; INTRAVENOUS ONCE
Status: DISCONTINUED | OUTPATIENT
Start: 2024-07-09 | End: 2024-07-09 | Stop reason: HOSPADM

## 2024-07-09 RX ADMIN — IOPAMIDOL 75 ML: 755 INJECTION, SOLUTION INTRAVENOUS at 13:23

## 2024-07-09 ASSESSMENT — ENCOUNTER SYMPTOMS
CHEST TIGHTNESS: 0
ABDOMINAL PAIN: 1
VOMITING: 0
WHEEZING: 0
NAUSEA: 1
SHORTNESS OF BREATH: 0
DIARRHEA: 0
COUGH: 0
BLOOD IN STOOL: 1

## 2024-07-09 ASSESSMENT — PAIN SCALES - GENERAL: PAINLEVEL_OUTOF10: 2

## 2024-07-09 ASSESSMENT — PAIN - FUNCTIONAL ASSESSMENT
PAIN_FUNCTIONAL_ASSESSMENT: ACTIVITIES ARE NOT PREVENTED
PAIN_FUNCTIONAL_ASSESSMENT: 0-10

## 2024-07-09 ASSESSMENT — PAIN DESCRIPTION - FREQUENCY: FREQUENCY: CONTINUOUS

## 2024-07-09 ASSESSMENT — PAIN DESCRIPTION - ORIENTATION: ORIENTATION: RIGHT;LOWER

## 2024-07-09 ASSESSMENT — PAIN DESCRIPTION - ONSET: ONSET: ON-GOING

## 2024-07-09 ASSESSMENT — PAIN DESCRIPTION - PAIN TYPE: TYPE: ACUTE PAIN

## 2024-07-09 ASSESSMENT — PAIN DESCRIPTION - LOCATION: LOCATION: ABDOMEN

## 2024-07-09 NOTE — ED PROVIDER NOTES
[facility] EMERGENCY DEPARTMENT  EMERGENCY DEPARTMENT ENCOUNTER        Pt Name: Jason Kenney  MRN: [id number]  Birthdate 1950  Date of evaluation: 7/9/2024  Provider: Baljeet Lira PA-C  PCP: Arnold Cr DO  Note Started: 1:20 PM EDT 7/9/24      MARLENI. I have evaluated this patient.        CHIEF COMPLAINT       Chief Complaint   Patient presents with    Rectal Bleeding     Pt brought to the hospital due to having black/tarry stools for the past 6 months, was on iron supplements for a few weeks, c/o RLQ abdominal pain for the past few weeks with abdominal bloating.       HISTORY OF PRESENT ILLNESS: 1 or more Elements     History from : Patient    Limitations to history : None    Jason Kenney is a 74 y.o. male with a history of hypertension, hyperlipidemia, CAD, paroxysmal atrial fibrillation, chronic anticoagulation with Eliquis, former tobacco abuse, diabetes and obesity who presents to the emergency department today stating he has been having black and tarry stools for the last 6 months.  He states now for the last 3 weeks he has had right lower abdominal pain that he describes as a dull, intermittent pain.  He does have occasional nausea without vomiting, diarrhea or constipation.  He has no urinary complaints.  He states more recently he has been feeling generally fatigued.  He states he had an EGD last week with Dr. Manuel and this was unremarkable.  He has a colonoscopy scheduled on 8/8/2024.  Patient denies having any chest pain or shortness of breath.  He denies headache, lightheadedness or dizziness        Nursing Notes were all reviewed and agreed with or any disagreements were addressed in the HPI.    REVIEW OF SYSTEMS :      Review of Systems   Constitutional:  Positive for fatigue. Negative for chills and fever.   Respiratory:  Negative for cough, chest tightness, shortness of breath and wheezing.    Cardiovascular:  Negative for chest pain, palpitations and leg

## 2024-07-11 ENCOUNTER — TELEPHONE (OUTPATIENT)
Dept: CARDIOLOGY CLINIC | Age: 74
End: 2024-07-11

## 2024-07-11 RX ORDER — LEVOTHYROXINE SODIUM 112 UG/1
112 TABLET ORAL DAILY
COMMUNITY

## 2024-07-11 RX ORDER — INSULIN GLARGINE 100 [IU]/ML
35 INJECTION, SOLUTION SUBCUTANEOUS NIGHTLY
COMMUNITY

## 2024-07-11 NOTE — TELEPHONE ENCOUNTER
[employer]er is moving pt's colonoscopy up to 7/15/24 due to bleeding. Asking if pt can hold eliquis 2 days prior. Please fax [phone]

## 2024-07-11 NOTE — PROGRESS NOTES
Patient _X__ reached--spoke with wife, Jil.    DATE__7/15/24______ TIME__0900______ARRIVAL__0730  FEC_______      Nothing to eat or drink after midnight the night before,except for what the prep instructions call for.If you do not have the instructions or do not understand them please contact your doctors office.    Follow any instructions your doctors office has given you including what medications to take the AM of your procedure and which ones to hold.You may use your inhalers - bring rescue inhalers with you DOS.If you take a long acting insulin the chanel prior please cut the dose in half and take no diabetic medications that AM.Follow specific doctors office instructions regarding blood thinners and if they want you to hold and for how long. If you are on a blood thinner and have no instructions please contact the office and ask-CHECK ELIQUIS    Dress comfortably,bring your insurance card,picture ID,and a complete list of medications, including supplements.    You must have a responsible adult to stay with you during the procedure,drive you home and stay with you.    [facility] phone number [phone] for any questions.      OTHER INSTRUCTIONS(if applicable)__take metoprolol, lisinopril, levothyroxine am of procedure_______________________________________________________        VISITOR POLICY(subject to change)    Current visitor policy is 2 visitors per patient.No children allowed. Mask at discretion of facility.  Visiting hours are 8a-8p. Overnight visitors will be at the discretion of the nurse. All policies are subject to change.

## 2024-07-11 NOTE — TELEPHONE ENCOUNTER
Letter composed per [employer] message. Faxed to  at number given. Successful transmission received back.   Spoke to patients wife per HIPAA and advised as well. Pt was in the background and she repeated it to him as well.

## 2024-07-15 ENCOUNTER — ANESTHESIA EVENT (OUTPATIENT)
Dept: ENDOSCOPY | Age: 74
End: 2024-07-15
Payer: MEDICARE

## 2024-07-15 ENCOUNTER — ANESTHESIA (OUTPATIENT)
Dept: ENDOSCOPY | Age: 74
End: 2024-07-15
Payer: MEDICARE

## 2024-07-15 ENCOUNTER — HOSPITAL ENCOUNTER (OUTPATIENT)
Age: 74
Setting detail: OUTPATIENT SURGERY
Discharge: HOME | End: 2024-07-15
Attending: INTERNAL MEDICINE | Admitting: INTERNAL MEDICINE
Payer: MEDICARE

## 2024-07-15 VITALS
SYSTOLIC BLOOD PRESSURE: 112 MMHG | DIASTOLIC BLOOD PRESSURE: 78 MMHG | OXYGEN SATURATION: 98 % | TEMPERATURE: 97.4 F | RESPIRATION RATE: 16 BRPM | HEART RATE: 65 BPM

## 2024-07-15 DIAGNOSIS — K92.1 HEMATOCHEZIA: ICD-10-CM

## 2024-07-15 LAB
GLUCOSE BLD-MCNC: 170 MG/DL (ref 70–99)
GLUCOSE BLD-MCNC: 173 MG/DL (ref 70–99)
PERFORMED ON: ABNORMAL
PERFORMED ON: ABNORMAL

## 2024-07-15 PROCEDURE — 88305 TISSUE EXAM BY PATHOLOGIST: CPT

## 2024-07-15 PROCEDURE — 2580000003 HC RX 258: Performed by: ANESTHESIOLOGY

## 2024-07-15 PROCEDURE — 3700000001 HC ADD 15 MINUTES (ANESTHESIA): Performed by: INTERNAL MEDICINE

## 2024-07-15 PROCEDURE — 88341 IMHCHEM/IMCYTCHM EA ADD ANTB: CPT

## 2024-07-15 PROCEDURE — 3700000000 HC ANESTHESIA ATTENDED CARE: Performed by: INTERNAL MEDICINE

## 2024-07-15 PROCEDURE — 6360000002 HC RX W HCPCS: Performed by: NURSE ANESTHETIST, CERTIFIED REGISTERED

## 2024-07-15 PROCEDURE — 88342 IMHCHEM/IMCYTCHM 1ST ANTB: CPT

## 2024-07-15 PROCEDURE — 2500000003 HC RX 250 WO HCPCS: Performed by: NURSE ANESTHETIST, CERTIFIED REGISTERED

## 2024-07-15 PROCEDURE — 3609010600 HC COLONOSCOPY POLYPECTOMY SNARE/COLD BIOPSY: Performed by: INTERNAL MEDICINE

## 2024-07-15 PROCEDURE — 7100000011 HC PHASE II RECOVERY - ADDTL 15 MIN: Performed by: INTERNAL MEDICINE

## 2024-07-15 PROCEDURE — 7100000010 HC PHASE II RECOVERY - FIRST 15 MIN: Performed by: INTERNAL MEDICINE

## 2024-07-15 PROCEDURE — 3609019800 HC COLONOSCOPY WITH SUBMUCOSAL INJECTION: Performed by: INTERNAL MEDICINE

## 2024-07-15 PROCEDURE — 3609010300 HC COLONOSCOPY W/BIOPSY SINGLE/MULTIPLE: Performed by: INTERNAL MEDICINE

## 2024-07-15 PROCEDURE — 2709999900 HC NON-CHARGEABLE SUPPLY: Performed by: INTERNAL MEDICINE

## 2024-07-15 RX ORDER — SODIUM CHLORIDE 9 MG/ML
INJECTION, SOLUTION INTRAVENOUS CONTINUOUS
Status: DISCONTINUED | OUTPATIENT
Start: 2024-07-15 | End: 2024-07-15 | Stop reason: HOSPADM

## 2024-07-15 RX ORDER — KETAMINE HYDROCHLORIDE 10 MG/ML
INJECTION, SOLUTION INTRAMUSCULAR; INTRAVENOUS PRN
Status: DISCONTINUED | OUTPATIENT
Start: 2024-07-15 | End: 2024-07-15 | Stop reason: SDUPTHER

## 2024-07-15 RX ORDER — DEXAMETHASONE SODIUM PHOSPHATE 4 MG/ML
INJECTION, SOLUTION INTRA-ARTICULAR; INTRALESIONAL; INTRAMUSCULAR; INTRAVENOUS; SOFT TISSUE PRN
Status: DISCONTINUED | OUTPATIENT
Start: 2024-07-15 | End: 2024-07-15 | Stop reason: SDUPTHER

## 2024-07-15 RX ORDER — ONDANSETRON 2 MG/ML
INJECTION INTRAMUSCULAR; INTRAVENOUS PRN
Status: DISCONTINUED | OUTPATIENT
Start: 2024-07-15 | End: 2024-07-15 | Stop reason: SDUPTHER

## 2024-07-15 RX ORDER — PROPOFOL 10 MG/ML
INJECTION, EMULSION INTRAVENOUS PRN
Status: DISCONTINUED | OUTPATIENT
Start: 2024-07-15 | End: 2024-07-15 | Stop reason: SDUPTHER

## 2024-07-15 RX ORDER — GLYCOPYRROLATE 0.2 MG/ML
INJECTION INTRAMUSCULAR; INTRAVENOUS PRN
Status: DISCONTINUED | OUTPATIENT
Start: 2024-07-15 | End: 2024-07-15 | Stop reason: SDUPTHER

## 2024-07-15 RX ADMIN — KETAMINE HYDROCHLORIDE 50 MG: 10 INJECTION, SOLUTION INTRAMUSCULAR; INTRAVENOUS at 08:58

## 2024-07-15 RX ADMIN — ONDANSETRON 4 MG: 2 INJECTION INTRAMUSCULAR; INTRAVENOUS at 08:57

## 2024-07-15 RX ADMIN — SODIUM CHLORIDE: 9 INJECTION, SOLUTION INTRAVENOUS at 07:49

## 2024-07-15 RX ADMIN — PROPOFOL 20 MG: 10 INJECTION, EMULSION INTRAVENOUS at 08:59

## 2024-07-15 RX ADMIN — DEXAMETHASONE SODIUM PHOSPHATE 8 MG: 4 INJECTION, SOLUTION INTRAMUSCULAR; INTRAVENOUS at 08:57

## 2024-07-15 RX ADMIN — PROPOFOL 120 MCG/KG/MIN: 10 INJECTION, EMULSION INTRAVENOUS at 08:58

## 2024-07-15 RX ADMIN — PROPOFOL 50 MG: 10 INJECTION, EMULSION INTRAVENOUS at 08:57

## 2024-07-15 RX ADMIN — GLYCOPYRROLATE 0.2 MG: 0.2 INJECTION, SOLUTION INTRAMUSCULAR; INTRAVENOUS at 09:24

## 2024-07-15 RX ADMIN — PHENYLEPHRINE HYDROCHLORIDE 100 MCG: 10 INJECTION INTRAVENOUS at 09:24

## 2024-07-15 ASSESSMENT — PAIN - FUNCTIONAL ASSESSMENT: PAIN_FUNCTIONAL_ASSESSMENT: 0-10

## 2024-07-15 NOTE — DISCHARGE INSTRUCTIONS
COLONSCOPY DISCHARGE INSTRUCTIONS    You may experience some lightheadedness for the next several hours.  Plan on quiet relaxation for the rest of today. Nap for four hours following procedure if possible.  A responsible adult needs to stay with you today.    Eat bland food and avoid anything greasy or spicy initially-progress to your normal diet gradually.  Diet restrictions as instructed.  You may resume home medications as instructed.    It is not unusual to experience some mild cramping or gas pains, and you may not have a bowel movement for several days.  If you had a polyp removed, avoid strenuous activity for 48 hours.  Avoid the use of aspirin or related compounds for one week, unless otherwise instructed by your physician.    You may notice a small amount of blood in your next few bowel movements, but if a large amount passes, call your physician.    If you have any of the following problems, notify your physician or return to the hospital emergency room : fever, chills, excessive bleeding, excessive vomiting, difficulty swallowing, uncontrolled pain, increased abdominal distention, shortness of breath or any other problems.    Call your doctor at [phone] if you have any concerns.     If you had any biopsies or polyps call for results in 14 business days.    See your physician's report for details about your procedure and recommendations.      ANESTHESIA DISCHARGE INSTRUCTIONS    Wear your seatbelt home.    You are under the influence of drugs-do not drink alcohol, drive ,operate machinery,or make any important decisions or sign any legal documentsfor 24 hours. You may resume normal activities tomorrow.  A responsible adult needs to be with you for 24 hours.  You may experience lightheadedness,dizziness,or sleepiness following surgery.    Rest at home today- increase activity as tolerated. It is recommended to take a four hour nap after procedure.  Progress slowly to a regular diet unless your

## 2024-07-15 NOTE — ANESTHESIA PRE PROCEDURE
(If Applicable): No results found for: \"COVID19\"        Anesthesia Evaluation  Patient summary reviewed and Nursing notes reviewed   no history of anesthetic complications:   Airway: Mallampati: II  TM distance: >3 FB   Neck ROM: full  Mouth opening: > = 3 FB   Dental:    (+) upper dentures and lower dentures      Pulmonary:   (+)     sleep apnea: on noncompliant,           (-) wheezes                          ROS comment: Former smoker   Cardiovascular:    (+) hypertension:, past MI:, CAD:, CABG/stent (CABG - no cp since):, dysrhythmias: atrial fibrillation and atrial flutter          Rate: normal                 ROS comment: Narrative & Impression    Transthoracic Echocardiography Report (TTE)      Demographics      Patient Name       CLAUDY [place]      Date of Study      01/17/2024        Gender              Male      Patient Number     [id number]        Date of Birth       1950      Visit Number       [id number]         Age                 73 year(s)      Accession Number   [id number]        Room Number         OP      Corporate ID       [id number]           Sonographer         Eboni Smith, [facility]      Ordering Physician Michael Kohler MD  Interpreting        Alejandro Kim MD,                                        Physician           FACC     Procedure     Type of Study      TTE procedure:ECHOCARDIOGRAM COMPLETE 2D W DOPPLER W COLOR.     Procedure Date  Date: 01/17/2024 Start: 01:11 PM     Study Location: [facility] - Echo Lab  Technical Quality: Limited visualization due to body habitus.     Additional Indications:EValuate heart structures and function.     Patient Status: Routine     Contrast Medium: Definity.     Height: 71 inches Weight: 309.01 pounds BSA: 2.54 m2 BMI: 43.1 kg/m2     BP: 130/66 mmHg      Conclusions      Summary   Technically difficult study due to body habitus. Definity was administered.   Left ventricular cavity size is normal with mild concentric left ventricular

## 2024-07-15 NOTE — PROGRESS NOTES
Pt arrived from procedure room to recovery bay 7. Report received from endo staff and CRNA. Pt is arousable to voice. Pt on RA,  and VSS. pt meets criteria for Phase II. Will continue with care plan.

## 2024-07-15 NOTE — ANESTHESIA POSTPROCEDURE EVALUATION
Department of Anesthesiology  Postprocedure Note    Patient: Jason Kenney  MRN: [id number]  YOB: 1950  Date of evaluation: 7/15/2024    Procedure Summary       Date: 07/15/24 Room / Location: [place] [address] / [facility]    Anesthesia Start: 0853 Anesthesia Stop: 0920    Procedures:       COLONOSCOPY BIOPSY      COLONOSCOPY SUBMUCOSAL SPOT INJECTION      COLONOSCOPY POLYPECTOMY SNARE/BIOPSY Diagnosis:       Hematochezia      (Hematochezia [K92.1])    Surgeons: Sushil Manuel MD Responsible Provider: Ramya Velazquez MD    Anesthesia Type: MAC ASA Status: 3            Anesthesia Type: No value filed.    Marge Phase I: Marge Score: 10    Marge Phase II: Marge Score: 10    Anesthesia Post Evaluation    Patient location during evaluation: PACU  Patient participation: complete - patient participated  Level of consciousness: awake  Airway patency: patent  Nausea & Vomiting: no vomiting  Cardiovascular status: hemodynamically stable  Respiratory status: acceptable  Hydration status: euvolemic  There was medical reason for not using a multimodal analgesia pain management approach.Pain management: adequate    No notable events documented.

## 2024-07-15 NOTE — H&P
with MAC sedation.    2.  Procedure options, risks and benefits reviewed with patient and/or guardian.  They express understanding.

## 2024-07-31 ENCOUNTER — HOSPITAL ENCOUNTER (OUTPATIENT)
Dept: CT IMAGING | Age: 74
Discharge: HOME | End: 2024-07-31
Attending: INTERNAL MEDICINE
Payer: MEDICARE

## 2024-07-31 DIAGNOSIS — K63.89 COLONIC MASS: ICD-10-CM

## 2024-07-31 PROCEDURE — 71260 CT THORAX DX C+: CPT

## 2024-07-31 PROCEDURE — 6360000004 HC RX CONTRAST MEDICATION: Performed by: INTERNAL MEDICINE

## 2024-07-31 RX ADMIN — IOPAMIDOL 75 ML: 755 INJECTION, SOLUTION INTRAVENOUS at 15:56

## 2024-08-01 ENCOUNTER — OFFICE VISIT (OUTPATIENT)
Dept: SURGERY | Age: 74
End: 2024-08-01
Payer: MEDICARE

## 2024-08-01 VITALS — BODY MASS INDEX: 43.01 KG/M2 | DIASTOLIC BLOOD PRESSURE: 60 MMHG | WEIGHT: 308.4 LBS | SYSTOLIC BLOOD PRESSURE: 119 MMHG

## 2024-08-01 DIAGNOSIS — C18.7 MALIGNANT NEOPLASM OF SIGMOID COLON (HCC): Primary | ICD-10-CM

## 2024-08-01 PROCEDURE — 1123F ACP DISCUSS/DSCN MKR DOCD: CPT | Performed by: SURGERY

## 2024-08-01 PROCEDURE — 3078F DIAST BP <80 MM HG: CPT | Performed by: SURGERY

## 2024-08-01 PROCEDURE — 3074F SYST BP LT 130 MM HG: CPT | Performed by: SURGERY

## 2024-08-01 PROCEDURE — 99204 OFFICE O/P NEW MOD 45 MIN: CPT | Performed by: SURGERY

## 2024-08-01 RX ORDER — SODIUM CHLORIDE 0.9 % (FLUSH) 0.9 %
5-40 SYRINGE (ML) INJECTION EVERY 12 HOURS SCHEDULED
OUTPATIENT
Start: 2024-08-01

## 2024-08-01 RX ORDER — SODIUM CHLORIDE 0.9 % (FLUSH) 0.9 %
5-40 SYRINGE (ML) INJECTION PRN
OUTPATIENT
Start: 2024-08-01

## 2024-08-01 RX ORDER — SODIUM CHLORIDE 9 MG/ML
INJECTION, SOLUTION INTRAVENOUS PRN
OUTPATIENT
Start: 2024-08-01

## 2024-08-01 ASSESSMENT — ENCOUNTER SYMPTOMS
RESPIRATORY NEGATIVE: 1
ALLERGIC/IMMUNOLOGIC NEGATIVE: 1
EYES NEGATIVE: 1
BLOOD IN STOOL: 1

## 2024-08-01 NOTE — PROGRESS NOTES
[facility] GENERAL AND LAPAROSCOPIC SURGERY                       PATIENT NAME: Jason Kenney        TODAY'S DATE: 8/1/2024    Reason for Consult:  Mass    Requesting Physician / PCP:  Dr. HORACE Manuel / Dr. KENYON Cr    HISTORY OF PRESENT ILLNESS:              The patient is a 74 y.o. male who presents with concerns of a colon mass. Had noted LGI bleeding clinically. Had colonoscopy done, sigmoid cancer noted. Two right colon benign polyps also removed. Had prior umbilical hernia repair, no prior intestinal operations. No chronic abd pain. Pt has had Eliquis. Has had follow up CTA for bleeding - was negative. No N/V. No obstructive symptoms.    Past Medical History:        Diagnosis Date    Acute rhinosinusitis 11/08/2013    Anxiety     Arthritis     CAD in native artery 03/27/2024    Chronic back pain     Depression     Diabetes mellitus (HCC)     Hernia of unspecified site of abdominal cavity without mention of obstruction or gangrene     Hyperlipidemia     Hypertension     no hx HTN    Hypothyroidism     Kidney stones     Movement disorder     EMMA (obstructive sleep apnea) 03/27/2024    does not use cpap    Osteoarthritis     PAF (paroxysmal atrial fibrillation) (HCC) 03/27/2024    Pneumonia     Type II or unspecified type diabetes mellitus without mention of complication, not stated as uncontrolled     Vitamin D deficiency        Past Surgical History:        Procedure Laterality Date    CHOLECYSTECTOMY      COLONOSCOPY N/A 7/15/2024    COLONOSCOPY BIOPSY performed by Sushil Manuel MD at [facility] ENDOSCOPY    COLONOSCOPY  7/15/2024    COLONOSCOPY SUBMUCOSAL SPOT INJECTION performed by Sushil Manuel MD at [facility] ENDOSCOPY    COLONOSCOPY  7/15/2024    COLONOSCOPY POLYPECTOMY SNARE/BIOPSY performed by Sushil Manuel MD at [facility] ENDOSCOPY    CYSTOSCOPY  04/16/2010    CYSTOSCOPY  03/21/2011    ESOPHAGEAL DILATATION  6/19/2024    ESOPHAGEAL DILATION CAMPO performed by Sushil Manuel MD at

## 2024-08-02 ENCOUNTER — PREP FOR PROCEDURE (OUTPATIENT)
Dept: SURGERY | Age: 74
End: 2024-08-02

## 2024-08-02 ENCOUNTER — TELEPHONE (OUTPATIENT)
Dept: CARDIOLOGY CLINIC | Age: 74
End: 2024-08-02

## 2024-08-02 DIAGNOSIS — C18.7 CANCER OF SIGMOID COLON (HCC): ICD-10-CM

## 2024-08-02 NOTE — TELEPHONE ENCOUNTER
Radha called stating that the Pt does not need a clearance but she wants to know since the Pt is having a Laparoscopic Sigmoid Colon Resection can the Pt hold his Eliquis.  If so, for how long.  Please fax to:  [phone].  Thank you    Surgery date: 08/28

## 2024-08-05 NOTE — TELEPHONE ENCOUNTER
I let Radha Carney know Jason can hold his Eliquis as noted below. She will let Jason know.     Ph: [phone] from [facility] General Laparascopic Surgery.

## 2024-08-06 NOTE — TELEPHONE ENCOUNTER
Letter faxed to [facility] at the number below as requested. Confirmation received.    Fax:  [phone]

## 2024-08-15 ENCOUNTER — TELEPHONE (OUTPATIENT)
Dept: SURGERY | Age: 74
End: 2024-08-15

## 2024-08-15 NOTE — TELEPHONE ENCOUNTER
Pt is scheduled for 8/28/24 Lap Sigmoid Colon Resection.    He tested positive on Mon 8/12/24. Symptoms started on Friday night, 8/9/24. No fever, just a cough and congestion. Jil called their PCP, he refused to put pt on medication since he is scheduled for surgery.     I will notify Dr. Gallardo about this status and we are leaving pt on the schedule as of now.

## 2024-08-15 NOTE — TELEPHONE ENCOUNTER
Pt tested positive for covid and wife Jil is concerned about upcoming surgery. Please call and advise [phone]

## 2024-08-21 NOTE — PROGRESS NOTES
DATE 8/28__      PLACE __x_ [address]                          TIME _0730__                                             ___ [address]                           Arrival _0600__     Surgeons office to give time ___      Surgery dates,times and arrival times are subject to change.Please check with your surgeon.  Bring a photo I.D.,insurance card and form of payment if you have a co pay.  Plan for someone 18 years or older to stay on site while you are her and to take you home after surgery.You are not permitted to drive yourself home. You cannot leave via Uber, Lyft, Taxi or Medical Transport by yourself. You must have someone with you. It is strongly suggested that someone stay with you the first 24 hours after anesthesia. Your surgery will be cancelled if you do not have a ride home. A parent or legal guardian guardian must stay with a child until the child is discharged. Please do not bring other children.  A History/Physical is required to be completed and dated within 30 days of your surgery. To be done by PCP __ Surgeon __x_or Hospitalist ___  You may be required to get labs __ EKG __ or a chest Xray ___ prior to surgery. To be done by _8/23___  Nothing to eat or drink after midnight the evening prior to surgery.  If your surgeon requires a bowel prep, follow their instructions.  You may brush your teeth.  Bring a complete list of your medications including vitamins and supplement with the name,dose and frequency.  Take the following medications with a sip of water the AM of surgery __synthroid, metoprolol__________________________________   DR Duron patients do not take any medications the AM of surgery.  If you can not be reached by phone to give specific medication instructions,you may use your inhalers,take your heart, blood pressure,seizure and thyroid medications with a sip of water the AM of surgery. Bring your rescue inhaler with you if you use one.  DO NOT take blood pressure medications  ending in \"pril\"or \"sartan\" the evening prior or morning of surgery (such as Lisinopril or Losartan).  For blood thinners such as Plavix, Eliquis, Effient, Pradaxa, Xarelto or antiplatelets such as Pletal get instructions on if you need to stop prior to surgery and for how long.  Has instructions ___  to get instructions from surgeon and prescribing doctor and surgeon ___.  Aspirin,Ibuprofen,Advil,Aleve and any anti inflammatories along with vitamins should be stopped 5-7 days before surgery or as directed by surgeon. Tylenol is okay to take until the evening prior to surgery.  If you take a long-acting insulin at night,cut the dose in half the evening prior to surgery.  If you take a GLP-1 receptor (such as Trulicity,Mounjaro, Ozempic weekly),do not take 7 days prior to surgery. If you take a daily dose such as Rybelsus you must stop at least 24 hours prior to surgery.  If your blood sugar is low and you are symptomatic the AM of surgery you may take sips of a sugary clear liquid.  If you take any medication for weight loss(such as Adipex Naltrexone,Phentermine) you should not take for a minium of 72 hours prior to surgery.  Follow your surgeons instructions on showering prior to surgery.If you were not given specific instructions ,shower with an antibacterial soap such as dial the morning of surgery.  Wear clean loose fitting clothing.  Remove all piercings,rings and jewelry and leave at home.  Glasses,hearing aides and contacts will be removed prior to surgery- bring appropriate cases.  Leave your hair down-no buns,ponytails or metal hair accessories.  NO nail polish -if you have artificial or gel nails check with your surgeon.   NO makeup especially eye makeup.  Do not smoke,drink alcohol or use recreational drugs 24 hours prior to surgery.  Bring a copy of your Living Will or Durable Power of  the day of surgery.  If you are staying overnight bring your cpap or bipap but leave your personnel belongings

## 2024-08-23 ENCOUNTER — HOSPITAL ENCOUNTER (OUTPATIENT)
Age: 74
Discharge: HOME | End: 2024-08-23
Payer: MEDICARE

## 2024-08-23 DIAGNOSIS — Z01.818 PREOP TESTING: ICD-10-CM

## 2024-08-23 LAB
ABO + RH BLD: NORMAL
ANION GAP SERPL CALCULATED.3IONS-SCNC: 11 MMOL/L (ref 3–16)
BLD GP AB SCN SERPL QL: NORMAL
BUN SERPL-MCNC: 17 MG/DL (ref 7–20)
CALCIUM SERPL-MCNC: 10.1 MG/DL (ref 8.3–10.6)
CHLORIDE SERPL-SCNC: 100 MMOL/L (ref 99–110)
CO2 SERPL-SCNC: 26 MMOL/L (ref 21–32)
CREAT SERPL-MCNC: 0.8 MG/DL (ref 0.8–1.3)
DEPRECATED RDW RBC AUTO: 14.3 % (ref 12.4–15.4)
GFR SERPLBLD CREATININE-BSD FMLA CKD-EPI: >90 ML/MIN/{1.73_M2}
GLUCOSE SERPL-MCNC: 192 MG/DL (ref 70–99)
HCT VFR BLD AUTO: 39.5 % (ref 40.5–52.5)
HGB BLD-MCNC: 13.4 G/DL (ref 13.5–17.5)
MCH RBC QN AUTO: 27.6 PG (ref 26–34)
MCHC RBC AUTO-ENTMCNC: 33.8 G/DL (ref 31–36)
MCV RBC AUTO: 81.6 FL (ref 80–100)
PLATELET # BLD AUTO: 254 K/UL (ref 135–450)
PMV BLD AUTO: 9.1 FL (ref 5–10.5)
POTASSIUM SERPL-SCNC: 4.5 MMOL/L (ref 3.5–5.1)
RBC # BLD AUTO: 4.84 M/UL (ref 4.2–5.9)
SODIUM SERPL-SCNC: 137 MMOL/L (ref 136–145)
WBC # BLD AUTO: 10.3 K/UL (ref 4–11)

## 2024-08-23 PROCEDURE — 80048 BASIC METABOLIC PNL TOTAL CA: CPT

## 2024-08-23 PROCEDURE — 85027 COMPLETE CBC AUTOMATED: CPT

## 2024-08-23 PROCEDURE — 36415 COLL VENOUS BLD VENIPUNCTURE: CPT

## 2024-08-23 PROCEDURE — 86901 BLOOD TYPING SEROLOGIC RH(D): CPT

## 2024-08-23 PROCEDURE — 86850 RBC ANTIBODY SCREEN: CPT

## 2024-08-23 PROCEDURE — 86900 BLOOD TYPING SEROLOGIC ABO: CPT

## 2024-08-28 ENCOUNTER — HOSPITAL ENCOUNTER (INPATIENT)
Age: 74
LOS: 3 days | Discharge: HOME | End: 2024-08-31
Attending: SURGERY | Admitting: SURGERY
Payer: MEDICARE

## 2024-08-28 ENCOUNTER — ANESTHESIA EVENT (OUTPATIENT)
Dept: OPERATING ROOM | Age: 74
End: 2024-08-28
Payer: MEDICARE

## 2024-08-28 ENCOUNTER — ANESTHESIA (OUTPATIENT)
Dept: OPERATING ROOM | Age: 74
End: 2024-08-28
Payer: MEDICARE

## 2024-08-28 DIAGNOSIS — C18.7 CANCER OF SIGMOID COLON (HCC): ICD-10-CM

## 2024-08-28 DIAGNOSIS — Z01.818 PREOP TESTING: Primary | ICD-10-CM

## 2024-08-28 LAB
ABO + RH BLD: NORMAL
BLD GP AB SCN SERPL QL: NORMAL
GLUCOSE BLD-MCNC: 199 MG/DL (ref 70–99)
GLUCOSE BLD-MCNC: 285 MG/DL (ref 70–99)
GLUCOSE BLD-MCNC: 306 MG/DL (ref 70–99)
GLUCOSE BLD-MCNC: 368 MG/DL (ref 70–99)
PERFORMED ON: ABNORMAL

## 2024-08-28 PROCEDURE — C9290 INJ, BUPIVACAINE LIPOSOME: HCPCS | Performed by: SURGERY

## 2024-08-28 PROCEDURE — 44207 L COLECTOMY/COLOPROCTOSTOMY: CPT | Performed by: SURGERY

## 2024-08-28 PROCEDURE — 0DJD8ZZ INSPECTION OF LOWER INTESTINAL TRACT, VIA NATURAL OR ARTIFICIAL OPENING ENDOSCOPIC: ICD-10-PCS | Performed by: SURGERY

## 2024-08-28 PROCEDURE — 86900 BLOOD TYPING SEROLOGIC ABO: CPT

## 2024-08-28 PROCEDURE — 3700000001 HC ADD 15 MINUTES (ANESTHESIA): Performed by: SURGERY

## 2024-08-28 PROCEDURE — 94150 VITAL CAPACITY TEST: CPT

## 2024-08-28 PROCEDURE — 1200000000 HC SEMI PRIVATE

## 2024-08-28 PROCEDURE — 83036 HEMOGLOBIN GLYCOSYLATED A1C: CPT

## 2024-08-28 PROCEDURE — 2500000003 HC RX 250 WO HCPCS: Performed by: SURGERY

## 2024-08-28 PROCEDURE — 2500000003 HC RX 250 WO HCPCS: Performed by: NURSE ANESTHETIST, CERTIFIED REGISTERED

## 2024-08-28 PROCEDURE — 2709999900 HC NON-CHARGEABLE SUPPLY: Performed by: SURGERY

## 2024-08-28 PROCEDURE — 86901 BLOOD TYPING SEROLOGIC RH(D): CPT

## 2024-08-28 PROCEDURE — 2580000003 HC RX 258: Performed by: SURGERY

## 2024-08-28 PROCEDURE — 36415 COLL VENOUS BLD VENIPUNCTURE: CPT

## 2024-08-28 PROCEDURE — 0DTN4ZZ RESECTION OF SIGMOID COLON, PERCUTANEOUS ENDOSCOPIC APPROACH: ICD-10-PCS | Performed by: SURGERY

## 2024-08-28 PROCEDURE — P9045 ALBUMIN (HUMAN), 5%, 250 ML: HCPCS | Performed by: NURSE ANESTHETIST, CERTIFIED REGISTERED

## 2024-08-28 PROCEDURE — 3700000000 HC ANESTHESIA ATTENDED CARE: Performed by: SURGERY

## 2024-08-28 PROCEDURE — 2720000010 HC SURG SUPPLY STERILE: Performed by: SURGERY

## 2024-08-28 PROCEDURE — 6360000002 HC RX W HCPCS: Performed by: SURGERY

## 2024-08-28 PROCEDURE — 3600000005 HC SURGERY LEVEL 5 BASE: Performed by: SURGERY

## 2024-08-28 PROCEDURE — 88305 TISSUE EXAM BY PATHOLOGIST: CPT

## 2024-08-28 PROCEDURE — 6370000000 HC RX 637 (ALT 250 FOR IP): Performed by: NURSE ANESTHETIST, CERTIFIED REGISTERED

## 2024-08-28 PROCEDURE — 7100000001 HC PACU RECOVERY - ADDTL 15 MIN: Performed by: SURGERY

## 2024-08-28 PROCEDURE — 3600000015 HC SURGERY LEVEL 5 ADDTL 15MIN: Performed by: SURGERY

## 2024-08-28 PROCEDURE — 6370000000 HC RX 637 (ALT 250 FOR IP): Performed by: SURGERY

## 2024-08-28 PROCEDURE — 88309 TISSUE EXAM BY PATHOLOGIST: CPT

## 2024-08-28 PROCEDURE — 86850 RBC ANTIBODY SCREEN: CPT

## 2024-08-28 PROCEDURE — 6360000002 HC RX W HCPCS: Performed by: ANESTHESIOLOGY

## 2024-08-28 PROCEDURE — A4217 STERILE WATER/SALINE, 500 ML: HCPCS | Performed by: SURGERY

## 2024-08-28 PROCEDURE — 6360000002 HC RX W HCPCS: Performed by: NURSE ANESTHETIST, CERTIFIED REGISTERED

## 2024-08-28 PROCEDURE — 7100000000 HC PACU RECOVERY - FIRST 15 MIN: Performed by: SURGERY

## 2024-08-28 PROCEDURE — 6370000000 HC RX 637 (ALT 250 FOR IP)

## 2024-08-28 PROCEDURE — 2580000003 HC RX 258: Performed by: NURSE ANESTHETIST, CERTIFIED REGISTERED

## 2024-08-28 RX ORDER — MAGNESIUM SULFATE HEPTAHYDRATE 500 MG/ML
INJECTION, SOLUTION INTRAMUSCULAR; INTRAVENOUS PRN
Status: DISCONTINUED | OUTPATIENT
Start: 2024-08-28 | End: 2024-08-28 | Stop reason: SDUPTHER

## 2024-08-28 RX ORDER — SODIUM CHLORIDE 0.9 % (FLUSH) 0.9 %
5-40 SYRINGE (ML) INJECTION PRN
Status: DISCONTINUED | OUTPATIENT
Start: 2024-08-28 | End: 2024-08-28 | Stop reason: HOSPADM

## 2024-08-28 RX ORDER — DEXAMETHASONE SODIUM PHOSPHATE 4 MG/ML
INJECTION, SOLUTION INTRA-ARTICULAR; INTRALESIONAL; INTRAMUSCULAR; INTRAVENOUS; SOFT TISSUE PRN
Status: DISCONTINUED | OUTPATIENT
Start: 2024-08-28 | End: 2024-08-28 | Stop reason: SDUPTHER

## 2024-08-28 RX ORDER — ENOXAPARIN SODIUM 100 MG/ML
30 INJECTION SUBCUTANEOUS 2 TIMES DAILY
Status: DISCONTINUED | OUTPATIENT
Start: 2024-08-29 | End: 2024-08-31 | Stop reason: HOSPADM

## 2024-08-28 RX ORDER — DEXTROSE MONOHYDRATE, SODIUM CHLORIDE, AND POTASSIUM CHLORIDE 50; 1.49; 4.5 G/1000ML; G/1000ML; G/1000ML
INJECTION, SOLUTION INTRAVENOUS CONTINUOUS
Status: DISCONTINUED | OUTPATIENT
Start: 2024-08-28 | End: 2024-08-29

## 2024-08-28 RX ORDER — METRONIDAZOLE 500 MG/100ML
500 INJECTION, SOLUTION INTRAVENOUS EVERY 8 HOURS
Status: DISCONTINUED | OUTPATIENT
Start: 2024-08-28 | End: 2024-08-28 | Stop reason: HOSPADM

## 2024-08-28 RX ORDER — CIPROFLOXACIN 2 MG/ML
400 INJECTION, SOLUTION INTRAVENOUS EVERY 12 HOURS
Status: DISCONTINUED | OUTPATIENT
Start: 2024-08-29 | End: 2024-08-28 | Stop reason: SDUPTHER

## 2024-08-28 RX ORDER — SUCCINYLCHOLINE/SOD CL,ISO/PF 200MG/10ML
SYRINGE (ML) INTRAVENOUS PRN
Status: DISCONTINUED | OUTPATIENT
Start: 2024-08-28 | End: 2024-08-28 | Stop reason: SDUPTHER

## 2024-08-28 RX ORDER — SODIUM CHLORIDE 9 MG/ML
INJECTION, SOLUTION INTRAVENOUS PRN
Status: DISCONTINUED | OUTPATIENT
Start: 2024-08-28 | End: 2024-08-28 | Stop reason: HOSPADM

## 2024-08-28 RX ORDER — DEXTROSE MONOHYDRATE 100 MG/ML
INJECTION, SOLUTION INTRAVENOUS CONTINUOUS PRN
Status: DISCONTINUED | OUTPATIENT
Start: 2024-08-28 | End: 2024-08-29 | Stop reason: SDUPTHER

## 2024-08-28 RX ORDER — SPIRONOLACTONE 25 MG/1
25 TABLET ORAL DAILY
Status: DISCONTINUED | OUTPATIENT
Start: 2024-08-28 | End: 2024-08-31 | Stop reason: HOSPADM

## 2024-08-28 RX ORDER — METOPROLOL TARTRATE 50 MG
50 TABLET ORAL 2 TIMES DAILY
Status: DISCONTINUED | OUTPATIENT
Start: 2024-08-28 | End: 2024-08-31 | Stop reason: HOSPADM

## 2024-08-28 RX ORDER — CIPROFLOXACIN 2 MG/ML
400 INJECTION, SOLUTION INTRAVENOUS EVERY 12 HOURS
Status: DISCONTINUED | OUTPATIENT
Start: 2024-08-28 | End: 2024-08-28 | Stop reason: SDUPTHER

## 2024-08-28 RX ORDER — ONDANSETRON 2 MG/ML
INJECTION INTRAMUSCULAR; INTRAVENOUS PRN
Status: DISCONTINUED | OUTPATIENT
Start: 2024-08-28 | End: 2024-08-28 | Stop reason: SDUPTHER

## 2024-08-28 RX ORDER — GLYCOPYRROLATE 0.2 MG/ML
INJECTION INTRAMUSCULAR; INTRAVENOUS PRN
Status: DISCONTINUED | OUTPATIENT
Start: 2024-08-28 | End: 2024-08-28 | Stop reason: SDUPTHER

## 2024-08-28 RX ORDER — SODIUM CHLORIDE 0.9 % (FLUSH) 0.9 %
5-40 SYRINGE (ML) INJECTION EVERY 12 HOURS SCHEDULED
Status: DISCONTINUED | OUTPATIENT
Start: 2024-08-28 | End: 2024-08-28 | Stop reason: HOSPADM

## 2024-08-28 RX ORDER — LISINOPRIL 5 MG/1
2.5 TABLET ORAL DAILY
Status: DISCONTINUED | OUTPATIENT
Start: 2024-08-28 | End: 2024-08-31 | Stop reason: HOSPADM

## 2024-08-28 RX ORDER — CIPROFLOXACIN 2 MG/ML
400 INJECTION, SOLUTION INTRAVENOUS EVERY 12 HOURS
Status: COMPLETED | OUTPATIENT
Start: 2024-08-29 | End: 2024-08-29

## 2024-08-28 RX ORDER — ROSUVASTATIN CALCIUM 20 MG/1
20 TABLET, COATED ORAL DAILY
Status: DISCONTINUED | OUTPATIENT
Start: 2024-08-28 | End: 2024-08-31 | Stop reason: HOSPADM

## 2024-08-28 RX ORDER — LIDOCAINE HYDROCHLORIDE 20 MG/ML
INJECTION, SOLUTION EPIDURAL; INFILTRATION; INTRACAUDAL; PERINEURAL PRN
Status: DISCONTINUED | OUTPATIENT
Start: 2024-08-28 | End: 2024-08-28 | Stop reason: SDUPTHER

## 2024-08-28 RX ORDER — ACETAMINOPHEN 500 MG
1000 TABLET ORAL EVERY 8 HOURS SCHEDULED
Status: COMPLETED | OUTPATIENT
Start: 2024-08-28 | End: 2024-08-31

## 2024-08-28 RX ORDER — DEXMEDETOMIDINE HYDROCHLORIDE 100 UG/ML
INJECTION, SOLUTION INTRAVENOUS PRN
Status: DISCONTINUED | OUTPATIENT
Start: 2024-08-28 | End: 2024-08-28 | Stop reason: SDUPTHER

## 2024-08-28 RX ORDER — HYDROMORPHONE HYDROCHLORIDE 2 MG/ML
0.5 INJECTION, SOLUTION INTRAMUSCULAR; INTRAVENOUS; SUBCUTANEOUS EVERY 5 MIN PRN
Status: DISCONTINUED | OUTPATIENT
Start: 2024-08-28 | End: 2024-08-28 | Stop reason: HOSPADM

## 2024-08-28 RX ORDER — LABETALOL HYDROCHLORIDE 5 MG/ML
5 INJECTION, SOLUTION INTRAVENOUS
Status: DISCONTINUED | OUTPATIENT
Start: 2024-08-28 | End: 2024-08-28 | Stop reason: HOSPADM

## 2024-08-28 RX ORDER — LANOLIN ALCOHOL/MO/W.PET/CERES
500 CREAM (GRAM) TOPICAL DAILY
Status: DISCONTINUED | OUTPATIENT
Start: 2024-08-28 | End: 2024-08-31 | Stop reason: HOSPADM

## 2024-08-28 RX ORDER — LIDOCAINE HYDROCHLORIDE 40 MG/ML
SOLUTION TOPICAL PRN
Status: DISCONTINUED | OUTPATIENT
Start: 2024-08-28 | End: 2024-08-28 | Stop reason: SDUPTHER

## 2024-08-28 RX ORDER — GLUCAGON 1 MG/ML
1 KIT INJECTION PRN
Status: DISCONTINUED | OUTPATIENT
Start: 2024-08-28 | End: 2024-08-29 | Stop reason: SDUPTHER

## 2024-08-28 RX ORDER — HYDROMORPHONE HYDROCHLORIDE 2 MG/ML
0.25 INJECTION, SOLUTION INTRAMUSCULAR; INTRAVENOUS; SUBCUTANEOUS EVERY 5 MIN PRN
Status: DISCONTINUED | OUTPATIENT
Start: 2024-08-28 | End: 2024-08-28 | Stop reason: HOSPADM

## 2024-08-28 RX ORDER — SODIUM CHLORIDE 0.9 % (FLUSH) 0.9 %
5-40 SYRINGE (ML) INJECTION PRN
Status: DISCONTINUED | OUTPATIENT
Start: 2024-08-28 | End: 2024-08-31 | Stop reason: HOSPADM

## 2024-08-28 RX ORDER — INSULIN LISPRO 100 [IU]/ML
0-8 INJECTION, SOLUTION INTRAVENOUS; SUBCUTANEOUS
Status: DISCONTINUED | OUTPATIENT
Start: 2024-08-28 | End: 2024-08-29 | Stop reason: SDUPTHER

## 2024-08-28 RX ORDER — METOCLOPRAMIDE HYDROCHLORIDE 5 MG/ML
10 INJECTION INTRAMUSCULAR; INTRAVENOUS EVERY 6 HOURS
Status: DISPENSED | OUTPATIENT
Start: 2024-08-28 | End: 2024-08-29

## 2024-08-28 RX ORDER — BUPIVACAINE HYDROCHLORIDE 5 MG/ML
INJECTION, SOLUTION EPIDURAL; INTRACAUDAL
Status: COMPLETED | OUTPATIENT
Start: 2024-08-28 | End: 2024-08-28

## 2024-08-28 RX ORDER — METRONIDAZOLE 500 MG/100ML
500 INJECTION, SOLUTION INTRAVENOUS EVERY 8 HOURS
Status: COMPLETED | OUTPATIENT
Start: 2024-08-28 | End: 2024-08-30

## 2024-08-28 RX ORDER — OXYCODONE HYDROCHLORIDE 5 MG/1
5 TABLET ORAL EVERY 4 HOURS PRN
Status: DISCONTINUED | OUTPATIENT
Start: 2024-08-28 | End: 2024-08-31 | Stop reason: HOSPADM

## 2024-08-28 RX ORDER — SODIUM CHLORIDE 9 MG/ML
INJECTION, SOLUTION INTRAVENOUS CONTINUOUS
Status: DISCONTINUED | OUTPATIENT
Start: 2024-08-28 | End: 2024-08-28 | Stop reason: HOSPADM

## 2024-08-28 RX ORDER — HYDROMORPHONE HYDROCHLORIDE 2 MG/ML
INJECTION, SOLUTION INTRAMUSCULAR; INTRAVENOUS; SUBCUTANEOUS PRN
Status: DISCONTINUED | OUTPATIENT
Start: 2024-08-28 | End: 2024-08-28 | Stop reason: SDUPTHER

## 2024-08-28 RX ORDER — ALBUTEROL SULFATE 90 UG/1
AEROSOL, METERED RESPIRATORY (INHALATION) PRN
Status: DISCONTINUED | OUTPATIENT
Start: 2024-08-28 | End: 2024-08-28 | Stop reason: SDUPTHER

## 2024-08-28 RX ORDER — MORPHINE SULFATE 2 MG/ML
2 INJECTION, SOLUTION INTRAMUSCULAR; INTRAVENOUS
Status: DISCONTINUED | OUTPATIENT
Start: 2024-08-28 | End: 2024-08-31 | Stop reason: HOSPADM

## 2024-08-28 RX ORDER — SODIUM CHLORIDE 0.9 % (FLUSH) 0.9 %
5-40 SYRINGE (ML) INJECTION EVERY 12 HOURS SCHEDULED
Status: DISCONTINUED | OUTPATIENT
Start: 2024-08-28 | End: 2024-08-31 | Stop reason: HOSPADM

## 2024-08-28 RX ORDER — INSULIN LISPRO 100 [IU]/ML
0-4 INJECTION, SOLUTION INTRAVENOUS; SUBCUTANEOUS NIGHTLY
Status: DISCONTINUED | OUTPATIENT
Start: 2024-08-28 | End: 2024-08-29

## 2024-08-28 RX ORDER — MAGNESIUM HYDROXIDE 1200 MG/15ML
LIQUID ORAL CONTINUOUS PRN
Status: COMPLETED | OUTPATIENT
Start: 2024-08-28 | End: 2024-08-28

## 2024-08-28 RX ORDER — VITAMIN B COMPLEX
1000 TABLET ORAL DAILY
Status: DISCONTINUED | OUTPATIENT
Start: 2024-08-28 | End: 2024-08-31 | Stop reason: HOSPADM

## 2024-08-28 RX ORDER — ALBUMIN, HUMAN INJ 5% 5 %
SOLUTION INTRAVENOUS PRN
Status: DISCONTINUED | OUTPATIENT
Start: 2024-08-28 | End: 2024-08-28 | Stop reason: SDUPTHER

## 2024-08-28 RX ORDER — FENTANYL CITRATE 50 UG/ML
INJECTION, SOLUTION INTRAMUSCULAR; INTRAVENOUS PRN
Status: DISCONTINUED | OUTPATIENT
Start: 2024-08-28 | End: 2024-08-28 | Stop reason: SDUPTHER

## 2024-08-28 RX ORDER — SODIUM CHLORIDE 9 MG/ML
INJECTION, SOLUTION INTRAVENOUS PRN
Status: DISCONTINUED | OUTPATIENT
Start: 2024-08-28 | End: 2024-08-31 | Stop reason: HOSPADM

## 2024-08-28 RX ORDER — LEVOTHYROXINE SODIUM 112 UG/1
112 TABLET ORAL DAILY
Status: DISCONTINUED | OUTPATIENT
Start: 2024-08-29 | End: 2024-08-31 | Stop reason: HOSPADM

## 2024-08-28 RX ORDER — ONDANSETRON 2 MG/ML
4 INJECTION INTRAMUSCULAR; INTRAVENOUS EVERY 4 HOURS PRN
Status: DISCONTINUED | OUTPATIENT
Start: 2024-08-28 | End: 2024-08-31 | Stop reason: HOSPADM

## 2024-08-28 RX ORDER — PROPOFOL 10 MG/ML
INJECTION, EMULSION INTRAVENOUS PRN
Status: DISCONTINUED | OUTPATIENT
Start: 2024-08-28 | End: 2024-08-28 | Stop reason: SDUPTHER

## 2024-08-28 RX ORDER — LIDOCAINE HYDROCHLORIDE 10 MG/ML
1 INJECTION, SOLUTION EPIDURAL; INFILTRATION; INTRACAUDAL; PERINEURAL
Status: DISCONTINUED | OUTPATIENT
Start: 2024-08-28 | End: 2024-08-28 | Stop reason: HOSPADM

## 2024-08-28 RX ORDER — FUROSEMIDE 40 MG
40 TABLET ORAL DAILY
Status: DISCONTINUED | OUTPATIENT
Start: 2024-08-28 | End: 2024-08-31 | Stop reason: HOSPADM

## 2024-08-28 RX ORDER — ONDANSETRON 2 MG/ML
4 INJECTION INTRAMUSCULAR; INTRAVENOUS
Status: DISCONTINUED | OUTPATIENT
Start: 2024-08-28 | End: 2024-08-28 | Stop reason: HOSPADM

## 2024-08-28 RX ORDER — NALOXONE HYDROCHLORIDE 0.4 MG/ML
INJECTION, SOLUTION INTRAMUSCULAR; INTRAVENOUS; SUBCUTANEOUS PRN
Status: DISCONTINUED | OUTPATIENT
Start: 2024-08-28 | End: 2024-08-28 | Stop reason: HOSPADM

## 2024-08-28 RX ORDER — ROCURONIUM BROMIDE 10 MG/ML
INJECTION, SOLUTION INTRAVENOUS PRN
Status: DISCONTINUED | OUTPATIENT
Start: 2024-08-28 | End: 2024-08-28 | Stop reason: SDUPTHER

## 2024-08-28 RX ORDER — SODIUM CHLORIDE, SODIUM LACTATE, POTASSIUM CHLORIDE, AND CALCIUM CHLORIDE .6; .31; .03; .02 G/100ML; G/100ML; G/100ML; G/100ML
IRRIGANT IRRIGATION
Status: COMPLETED | OUTPATIENT
Start: 2024-08-28 | End: 2024-08-28

## 2024-08-28 RX ADMIN — LIDOCAINE HYDROCHLORIDE 100 MG: 20 INJECTION, SOLUTION EPIDURAL; INFILTRATION; INTRACAUDAL; PERINEURAL at 07:39

## 2024-08-28 RX ADMIN — HYDROMORPHONE HYDROCHLORIDE 1 MG: 2 INJECTION, SOLUTION INTRAMUSCULAR; INTRAVENOUS; SUBCUTANEOUS at 09:10

## 2024-08-28 RX ADMIN — MAGNESIUM SULFATE HEPTAHYDRATE 0.4 G: 500 INJECTION, SOLUTION INTRAMUSCULAR; INTRAVENOUS at 09:16

## 2024-08-28 RX ADMIN — METRONIDAZOLE 500 MG: 500 INJECTION, SOLUTION INTRAVENOUS at 17:17

## 2024-08-28 RX ADMIN — ROCURONIUM BROMIDE 50 MG: 10 INJECTION, SOLUTION INTRAVENOUS at 07:45

## 2024-08-28 RX ADMIN — Medication 160 MG: at 07:39

## 2024-08-28 RX ADMIN — SODIUM CHLORIDE: 9 INJECTION, SOLUTION INTRAVENOUS at 06:55

## 2024-08-28 RX ADMIN — LIDOCAINE HYDROCHLORIDE 4 ML: 40 SOLUTION TOPICAL at 07:39

## 2024-08-28 RX ADMIN — INSULIN LISPRO 4 UNITS: 100 INJECTION, SOLUTION INTRAVENOUS; SUBCUTANEOUS at 20:26

## 2024-08-28 RX ADMIN — LISINOPRIL 2.5 MG: 5 TABLET ORAL at 15:33

## 2024-08-28 RX ADMIN — METOPROLOL TARTRATE 50 MG: 50 TABLET, FILM COATED ORAL at 20:27

## 2024-08-28 RX ADMIN — ACETAMINOPHEN 1000 MG: 500 TABLET ORAL at 20:27

## 2024-08-28 RX ADMIN — DEXMEDETOMIDINE HYDROCHLORIDE 4 MCG: 100 INJECTION, SOLUTION INTRAVENOUS at 09:24

## 2024-08-28 RX ADMIN — MAGNESIUM SULFATE HEPTAHYDRATE 0.2 G: 500 INJECTION, SOLUTION INTRAMUSCULAR; INTRAVENOUS at 09:10

## 2024-08-28 RX ADMIN — ROCURONIUM BROMIDE 10 MG: 10 INJECTION, SOLUTION INTRAVENOUS at 08:29

## 2024-08-28 RX ADMIN — METRONIDAZOLE 500 MG: 500 INJECTION, SOLUTION INTRAVENOUS at 07:49

## 2024-08-28 RX ADMIN — ACETAMINOPHEN 1000 MG: 500 TABLET ORAL at 15:32

## 2024-08-28 RX ADMIN — CIPROFLOXACIN 400 MG: 2 INJECTION, SOLUTION INTRAVENOUS at 15:31

## 2024-08-28 RX ADMIN — ONDANSETRON 4 MG: 2 INJECTION INTRAMUSCULAR; INTRAVENOUS at 07:56

## 2024-08-28 RX ADMIN — ALBUMIN (HUMAN) 25 G: 12.5 INJECTION, SOLUTION INTRAVENOUS at 07:53

## 2024-08-28 RX ADMIN — PROPOFOL 180 MG: 10 INJECTION, EMULSION INTRAVENOUS at 07:39

## 2024-08-28 RX ADMIN — FUROSEMIDE 40 MG: 40 TABLET ORAL at 15:33

## 2024-08-28 RX ADMIN — METRONIDAZOLE 500 MG: 500 INJECTION, SOLUTION INTRAVENOUS at 23:33

## 2024-08-28 RX ADMIN — POTASSIUM CHLORIDE, DEXTROSE MONOHYDRATE AND SODIUM CHLORIDE: 150; 5; 450 INJECTION, SOLUTION INTRAVENOUS at 14:06

## 2024-08-28 RX ADMIN — MAGNESIUM SULFATE HEPTAHYDRATE 0.2 G: 500 INJECTION, SOLUTION INTRAMUSCULAR; INTRAVENOUS at 08:59

## 2024-08-28 RX ADMIN — ROSUVASTATIN 20 MG: 20 TABLET, FILM COATED ORAL at 15:33

## 2024-08-28 RX ADMIN — PHENYLEPHRINE HYDROCHLORIDE 30 MCG/MIN: 10 INJECTION INTRAVENOUS at 07:48

## 2024-08-28 RX ADMIN — SUGAMMADEX 300 MG: 100 INJECTION, SOLUTION INTRAVENOUS at 10:14

## 2024-08-28 RX ADMIN — MAGNESIUM SULFATE HEPTAHYDRATE 0.2 G: 500 INJECTION, SOLUTION INTRAMUSCULAR; INTRAVENOUS at 08:49

## 2024-08-28 RX ADMIN — DEXMEDETOMIDINE HYDROCHLORIDE 4 MCG: 100 INJECTION, SOLUTION INTRAVENOUS at 09:30

## 2024-08-28 RX ADMIN — INSULIN LISPRO 8 UNITS: 100 INJECTION, SOLUTION INTRAVENOUS; SUBCUTANEOUS at 16:30

## 2024-08-28 RX ADMIN — HYDROMORPHONE HYDROCHLORIDE 0.5 MG: 2 INJECTION, SOLUTION INTRAMUSCULAR; INTRAVENOUS; SUBCUTANEOUS at 10:55

## 2024-08-28 RX ADMIN — DEXAMETHASONE SODIUM PHOSPHATE 4 MG: 4 INJECTION, SOLUTION INTRAMUSCULAR; INTRAVENOUS at 07:56

## 2024-08-28 RX ADMIN — ALBUTEROL SULFATE 4 PUFF: 90 AEROSOL, METERED RESPIRATORY (INHALATION) at 07:42

## 2024-08-28 RX ADMIN — GLYCOPYRROLATE 0.2 MG: 0.2 INJECTION, SOLUTION INTRAMUSCULAR; INTRAVENOUS at 08:00

## 2024-08-28 RX ADMIN — CYANOCOBALAMIN TAB 1000 MCG 500 MCG: 1000 TAB at 15:33

## 2024-08-28 RX ADMIN — INSULIN HUMAN 5 UNITS: 100 INJECTION, SOLUTION PARENTERAL at 12:03

## 2024-08-28 RX ADMIN — FENTANYL CITRATE 50 MCG: 50 INJECTION, SOLUTION INTRAMUSCULAR; INTRAVENOUS at 08:11

## 2024-08-28 RX ADMIN — SPIRONOLACTONE 25 MG: 25 TABLET ORAL at 15:32

## 2024-08-28 RX ADMIN — ALBUTEROL SULFATE 4 PUFF: 90 AEROSOL, METERED RESPIRATORY (INHALATION) at 10:14

## 2024-08-28 RX ADMIN — Medication 1000 UNITS: at 15:33

## 2024-08-28 RX ADMIN — ROCURONIUM BROMIDE 20 MG: 10 INJECTION, SOLUTION INTRAVENOUS at 08:57

## 2024-08-28 RX ADMIN — SODIUM CHLORIDE 3000 MG: 900 INJECTION INTRAVENOUS at 07:39

## 2024-08-28 RX ADMIN — DEXMEDETOMIDINE HYDROCHLORIDE 4 MCG: 100 INJECTION, SOLUTION INTRAVENOUS at 09:58

## 2024-08-28 RX ADMIN — SODIUM CHLORIDE: 9 INJECTION, SOLUTION INTRAVENOUS at 10:11

## 2024-08-28 RX ADMIN — FENTANYL CITRATE 50 MCG: 50 INJECTION, SOLUTION INTRAMUSCULAR; INTRAVENOUS at 08:14

## 2024-08-28 RX ADMIN — HYDROMORPHONE HYDROCHLORIDE 0.5 MG: 2 INJECTION, SOLUTION INTRAMUSCULAR; INTRAVENOUS; SUBCUTANEOUS at 11:19

## 2024-08-28 ASSESSMENT — PAIN DESCRIPTION - PAIN TYPE
TYPE: SURGICAL PAIN

## 2024-08-28 ASSESSMENT — PAIN DESCRIPTION - LOCATION
LOCATION: ABDOMEN

## 2024-08-28 ASSESSMENT — PAIN SCALES - GENERAL
PAINLEVEL_OUTOF10: 4
PAINLEVEL_OUTOF10: 7
PAINLEVEL_OUTOF10: 5

## 2024-08-28 ASSESSMENT — PAIN DESCRIPTION - DESCRIPTORS
DESCRIPTORS: ACHING
DESCRIPTORS: ACHING

## 2024-08-28 ASSESSMENT — PAIN - FUNCTIONAL ASSESSMENT: PAIN_FUNCTIONAL_ASSESSMENT: 0-10

## 2024-08-28 ASSESSMENT — PAIN DESCRIPTION - ORIENTATION
ORIENTATION: MID;LOWER
ORIENTATION: MID

## 2024-08-28 NOTE — PROGRESS NOTES
Patient transferred from OR to PACU, responds to voice, VSS, 4 abdominal incisions well approximated with surgical glue.

## 2024-08-28 NOTE — H&P
[facility] Laparoscopic [facility]      I have reviewed the history and physical and examined the patient and find no relevant changes.    I have reviewed with the patient and/or family the risks, benefits, and alternatives to the procedure.    Giles Gallardo MD  8/28/2024

## 2024-08-28 NOTE — PROGRESS NOTES
Incentive Spirometry education and demonstration completed by Respiratory Therapy Yes      Response to education: Excellent     Teaching Time: 20 minutes    Minimum Predicted Vital Capacity - 730 mL.  Patient's Actual Vital Capacity - 1000 mL. Turning over to Nursing for routine follow-up Yes.    Comments: IS goal met    Electronically signed by Abdelrahman Steward RCP on 8/28/2024 at 4:50 PM

## 2024-08-28 NOTE — ANESTHESIA PRE PROCEDURE
Department of Anesthesiology  Preprocedure Note       Name:  Jason Kenney   Age:  74 y.o.  :  1950                                          MRN:  [id number]         Date:  2024      Surgeon: Surgeon(s):  Giles Gallardo MD    Procedure: Procedure(s):  LAPAROSCOPIC SIGMOID COLON RESECTION  FLEXIBLE SIGMOIDOSCOPY    Medications prior to admission:   Prior to Admission medications    Medication Sig Start Date End Date Taking? Authorizing Provider   vitamin D (CHOLECALCIFEROL) 25 MCG (1000 UT) TABS tablet Take 1 tablet by mouth daily   Yes Cheryl Love MD   insulin glargine (LANTUS) 100 UNIT/ML injection vial Inject 35 Units into the skin nightly   Yes ProviderCheryl MD   insulin lispro protamine & lispro (HUMALOG MIX) (75-25) 100 UNIT per ML SUSP injection vial Inject 22 Units into the skin 3 times daily (before meals)   Yes ProviderCheryl MD   levothyroxine (SYNTHROID) 112 MCG tablet Take 1 tablet by mouth Daily   Yes ProviderCheryl MD   spironolactone (ALDACTONE) 25 MG tablet Take 1 tablet by mouth daily 24  Yes Michael Kohler MD   lisinopril (PRINIVIL;ZESTRIL) 2.5 MG tablet Oral for 30 Days  Patient taking differently: daily Oral for 30 Days 24  Yes Michael Kohler MD   rosuvastatin (CRESTOR) 20 MG tablet Take 1 tablet by mouth daily 24  Yes Michael Kohler MD   furosemide (LASIX) 40 MG tablet Take 1 tablet by mouth daily 10/21/22  Yes ProviderCheryl MD   metoprolol tartrate (LOPRESSOR) 25 MG tablet Taking 50 mg twice a day 19  Yes ProviderCheryl MD   vitamin B-12 (CYANOCOBALAMIN) 1000 MCG tablet Take 0.5 tablets by mouth daily   Yes Cheryl Love MD   metFORMIN (GLUCOPHAGE) 1000 MG tablet Take 1 tablet by mouth 2 times daily (with meals)   Yes Cheryl Love MD   apixaban (ELIQUIS) 2.5 MG TABS tablet Take 1 tablet by mouth 2 times daily 3/22/24   Michael Kohler MD       Current medications:       umbilical   • JOINT REPLACEMENT      right   • JOINT REPLACEMENT      left   • LITHOTRIPSY     • TOTAL KNEE ARTHROPLASTY      two   • UPPER GASTROINTESTINAL ENDOSCOPY N/A 2024    ESOPHAGOGASTRODUODENOSCOPY BIOPSY performed by Sushil Manuel MD at [facility] ENDOSCOPY   • URETER STENT PLACEMENT     • URETHRAL STRICTURE DILATATION  2011       Social History:    Social History     Tobacco Use   • Smoking status: Former     Current packs/day: 0.00     Average packs/day: 1 pack/day for 20.0 years (20.0 ttl pk-yrs)     Types: Cigarettes     Start date: 1992     Quit date: 2012     Years since quittin.2   • Smokeless tobacco: Never   • Tobacco comments:     quit   Substance Use Topics   • Alcohol use: No                                Counseling given: Not Answered  Tobacco comments: quit      Vital Signs (Current):   Vitals:    24 1101 24 0647   BP:  116/60   Pulse:  71   Resp:  15   Temp:  96.9 °F (36.1 °C)   TempSrc:  Temporal   SpO2:  94%   Weight: (!) 139.7 kg (308 lb) (!) 140.6 kg (310 lb)   Height: 1.803 m (5' 11\")                                               BP Readings from Last 3 Encounters:   24 116/60   24 119/60   07/15/24 112/78       NPO Status: Time of last liquid consumption:                         Time of last solid consumption:                         Date of last liquid consumption: 24                        Date of last solid food consumption: 24    BMI:   Wt Readings from Last 3 Encounters:   24 (!) 140.6 kg (310 lb)   24 (!) 139.9 kg (308 lb 6.4 oz)   24 (!) 140.6 kg (310 lb)     Body mass index is 43.24 kg/m².    CBC:   Lab Results   Component Value Date/Time    WBC 10.3 2024 09:30 AM    RBC 4.84 2024 09:30 AM    HGB 13.4 2024 09:30 AM    HCT 39.5 2024 09:30 AM    MCV 81.6 2024 09:30 AM    RDW 14.3 2024 09:30 AM     2024 09:30 AM       CMP:   Lab Results

## 2024-08-28 NOTE — CARE COORDINATION
IMM Letter       08/28/24 1411   IMM Letter   IMM Letter given to Patient/Family/Significant other/Guardian/POA/by: Patient   IMM Letter date given: 08/28/24   IMM Letter time given: 1409     DAVID Ng RN    [facility]  Phone: [phone]

## 2024-08-28 NOTE — PROGRESS NOTES
Pt transferred to the unit, A&O, VSS, will continue to follow.     1500: Admission assessment complete, VSS on 4L oxygen, pt A&Ox4 up in chair. Incisions to abd CDI, c/o pain 7/10 to abd. Peña cath draining clear yellow urine. POC and education reviewed with the patient. Safety measures provided. All needs met at this time, call light in reach, will continue to monitor.

## 2024-08-28 NOTE — CARE COORDINATION
Discharge Planning Note:    Chart reviewed and it appears that patient has minimal needs for discharge at this time. Discussed with patient’s nurse and requested that case management be notified if discharge needs are identified.     - Current discharge plan is for the patient to return home.    - PCP: Arnold Cr    Case management will continue to follow progress and update discharge plan as needed.      Risk of Readmission Score: N/A    DAVID Ng RN    [place]  Phone: [phone]

## 2024-08-28 NOTE — PROGRESS NOTES
Patient awake and alert, VSS, dilaudid given for pain, abdominal incisions well approximated, denies nausea, tolerating ice chips, phase I discharge criteria met, waiting for 4T room.  Family at the bedside.

## 2024-08-28 NOTE — BRIEF OP NOTE
Brief Postoperative Note      Patient: Jason Kenney  YOB: 1950  MRN: [id number]    Date of Procedure: 8/28/2024    Pre-Op Diagnosis Codes:      * Cancer of sigmoid colon (HCC) [C18.7]    Post-Op Diagnosis: Same       Procedure(s):  LAPAROSCOPIC SIGMOID COLON RESECTION  FLEXIBLE SIGMOIDOSCOPY    Surgeon(s):  Giles Gallardo MD    Assistant:  First Assistant: Jozef Anderson RN    Anesthesia: General    Estimated Blood Loss (mL): Minimal    Complications: None    Specimens:   ID Type Source Tests Collected by Time Destination   A : A) Sigmoid Colon Tissue Colon SURGICAL PATHOLOGY Giles Gallardo MD 8/28/2024 0953    B : B) Anastomotic Rings and Additional Proximal Margin Tissue Tissue SURGICAL PATHOLOGY Giles Gallardo MD 8/28/2024 0954        Implants:  * No implants in log *      Drains:   Urinary Catheter 08/28/24 2 Way (Active)   Catheter Indications Perioperative use for selected surgical procedures 08/28/24 1030   Urine Color Yellow 08/28/24 1030   Urine Appearance Clear 08/28/24 1030   Collection Container Standard 08/28/24 1030       Findings:  Infection Present At Time Of Surgery (PATOS) (choose all levels that have infection present):  No infection present  Other Findings: Colorectal anastomosis completed  Colon Resection  Operation performed with curative intent Yes   Tumor Location (select all that apply) Sigmoid colon   Extent of colon and vascular resection (select all that apply) Sigmoid resection - inferior mesenteric        Electronically signed by Giles Gallardo MD on 8/28/2024 at 10:48 AM

## 2024-08-28 NOTE — OP NOTE
[address] [address]                            OPERATIVE REPORT      PATIENT NAME: LOY LOCO               : 1950  MED REC NO: [id number]                      ROOM: [address] [place] [address]  ACCOUNT NO: [id number]                       ADMIT DATE: 2024  PROVIDER: Giles Gallardo MD      DATE OF PROCEDURE:  2024    SURGEON:  Giles Gallardo MD    PREOPERATIVE DIAGNOSIS:  Cancer of sigmoid colon.    POSTOPERATIVE DIAGNOSIS:  Cancer of sigmoid colon.    PROCEDURES:  Laparoscopic sigmoid colon resection with colorectal anastomosis, flexible sigmoidoscopy.    ANESTHESIA:  General plus local.    ESTIMATED BLOOD LOSS:  Minimal.    COMPLICATIONS:  None.    SPECIMENS:    1. Sigmoid colon resection with single resection performed for curative intent including the entire mesenteric resection to the root of the inferior mesenteric vasculature.  2. Additional proximal margin.  3. Anastomotic rings.    FINDINGS:  The patient presented with sigmoid colon cancer.  He has had ink tattoo placed per Dr. Castellanos distal to the mass.  This was resected for curative intent today including the entire mesocolon resection based on root of the inferior mesenteric artery.  The margins appeared widely clear clinically.  No evidence of intra-abdominal carcinomatosis was noted.  Following resection and anastomosis, flexible sigmoidoscopy was performed and appeared fine.    DETAILS OF SURGERY:  The patient was brought to the operating room and placed on the operating table in supine position.  Compression boots were placed.  General anesthetic was administered.  Lithotomy position was established.  The abdomen and perineal area were all prepped and draped sterilely.  Time-out was done.  Mid upper abdominal 5 mm direct view port was placed, and the abdominal cavity was insufflated to 15 mmHg pressure.  Right lateral abdominal 5 mm port and  right lower quadrant 12 mm port were placed under direct vision.  The abdomen was examined, and the area of the tumor was identified.  The left colon and sigmoid colon were mobilized along the lateral line of Toldt, and the bowel was staple transected approximately 10 cm up from the proximal site of the tumor.  An Westbrook 60 mm stapler with a gold load was used.  The mesentery was scored and taken down to the root of the inferior mesenteric vessels and dissected down over the sacral promontory down to the rectosigmoid junction.  The peritoneum was opened.  The bowel was elevated in this region and following mesenteric resection, end point distal to the prior tattooed staple line which was noted in the colonoscopy report to be 5 cm distal to the tumor was stapled.  This was also done with the Westbrook 60 mm stapler with a gold load.  The mesenteric resection all appeared hemostatic.  The colon was mobilized a bit more along the lateral line of Toldt, preserving all the vasculature and sat easily down to the rectum following mobilization of the left colon.  The site for bringing the tumor out was then selected in the left abdominal wall.  A transverse incision was made at this site approximately 5.5 to 6 cm in size.  The Tobin wound retractor was placed at this site.  The specimen was then removed and sent to the back table.  With palpation, it was widely clear margin.  Surgical team then changed gloves.  We brought the end of the proximal colon out, protected the wound with the Tobin wound retractor, and then cleaned this off and removed a few more centimeters of proximal margin to get to a good healthy colon, which we then placed a pursestring across.  The end of the colon was cut and removed.  The pursestring was opened and sized up to 29 mm dilator easily.  The stapler was then selected.  The end was placed in the bowel and the pursestring was tied.  Additional 3-0 silk was placed securing this in place.  The

## 2024-08-28 NOTE — ANESTHESIA POSTPROCEDURE EVALUATION
Department of Anesthesiology  Postprocedure Note    Patient: Jason Kenney  MRN: [id number]  YOB: 1950  Date of evaluation: 8/28/2024    Procedure Summary       Date: 08/28/24 Room / Location: [address]    Anesthesia Start: 0730 Anesthesia Stop: 1035    Procedures:       LAPAROSCOPIC SIGMOID COLON RESECTION (Abdomen)      FLEXIBLE SIGMOIDOSCOPY Diagnosis:       Cancer of sigmoid colon (HCC)      (Cancer of sigmoid colon (HCC) [C18.7])    Surgeons: Giles Gallardo MD Responsible Provider: Ramya Velazquez MD    Anesthesia Type: general ASA Status: 3            Anesthesia Type: No value filed.    Marge Phase I: Marge Score: 8    Marge Phase II:      Anesthesia Post Evaluation    Patient location during evaluation: PACU  Patient participation: complete - patient participated  Level of consciousness: awake and alert  Airway patency: patent  Nausea & Vomiting: no vomiting and no nausea  Cardiovascular status: hemodynamically stable  Respiratory status: acceptable  Hydration status: stable  Pain management: adequate    No notable events documented.

## 2024-08-29 LAB
ANION GAP SERPL CALCULATED.3IONS-SCNC: 12 MMOL/L (ref 3–16)
BASOPHILS # BLD: 0 K/UL (ref 0–0.2)
BASOPHILS NFR BLD: 0.3 %
BUN SERPL-MCNC: 16 MG/DL (ref 7–20)
CALCIUM SERPL-MCNC: 9.2 MG/DL (ref 8.3–10.6)
CHLORIDE SERPL-SCNC: 99 MMOL/L (ref 99–110)
CO2 SERPL-SCNC: 23 MMOL/L (ref 21–32)
CREAT SERPL-MCNC: 0.8 MG/DL (ref 0.8–1.3)
DEPRECATED RDW RBC AUTO: 14.4 % (ref 12.4–15.4)
EOSINOPHIL # BLD: 0.1 K/UL (ref 0–0.6)
EOSINOPHIL NFR BLD: 0.5 %
EST. AVERAGE GLUCOSE BLD GHB EST-MCNC: 194.4 MG/DL
EST. AVERAGE GLUCOSE BLD GHB EST-MCNC: 194.4 MG/DL
GFR SERPLBLD CREATININE-BSD FMLA CKD-EPI: >90 ML/MIN/{1.73_M2}
GLUCOSE BLD-MCNC: 151 MG/DL (ref 70–99)
GLUCOSE BLD-MCNC: 162 MG/DL (ref 70–99)
GLUCOSE BLD-MCNC: 259 MG/DL (ref 70–99)
GLUCOSE BLD-MCNC: 269 MG/DL (ref 70–99)
GLUCOSE BLD-MCNC: 321 MG/DL (ref 70–99)
GLUCOSE SERPL-MCNC: 284 MG/DL (ref 70–99)
HBA1C MFR BLD: 8.4 %
HBA1C MFR BLD: 8.4 %
HCT VFR BLD AUTO: 36.2 % (ref 40.5–52.5)
HGB BLD-MCNC: 11.7 G/DL (ref 13.5–17.5)
LYMPHOCYTES # BLD: 1.6 K/UL (ref 1–5.1)
LYMPHOCYTES NFR BLD: 14.1 %
MCH RBC QN AUTO: 26.6 PG (ref 26–34)
MCHC RBC AUTO-ENTMCNC: 32.3 G/DL (ref 31–36)
MCV RBC AUTO: 82.4 FL (ref 80–100)
MONOCYTES # BLD: 1 K/UL (ref 0–1.3)
MONOCYTES NFR BLD: 8.2 %
NEUTROPHILS # BLD: 8.9 K/UL (ref 1.7–7.7)
NEUTROPHILS NFR BLD: 76.9 %
PERFORMED ON: ABNORMAL
PLATELET # BLD AUTO: 221 K/UL (ref 135–450)
PMV BLD AUTO: 8.6 FL (ref 5–10.5)
POTASSIUM SERPL-SCNC: 5.1 MMOL/L (ref 3.5–5.1)
RBC # BLD AUTO: 4.4 M/UL (ref 4.2–5.9)
SODIUM SERPL-SCNC: 134 MMOL/L (ref 136–145)
WBC # BLD AUTO: 11.6 K/UL (ref 4–11)

## 2024-08-29 PROCEDURE — 6360000002 HC RX W HCPCS: Performed by: SURGERY

## 2024-08-29 PROCEDURE — 83036 HEMOGLOBIN GLYCOSYLATED A1C: CPT

## 2024-08-29 PROCEDURE — 6370000000 HC RX 637 (ALT 250 FOR IP): Performed by: SURGERY

## 2024-08-29 PROCEDURE — 85025 COMPLETE CBC W/AUTO DIFF WBC: CPT

## 2024-08-29 PROCEDURE — 80048 BASIC METABOLIC PNL TOTAL CA: CPT

## 2024-08-29 PROCEDURE — 99024 POSTOP FOLLOW-UP VISIT: CPT | Performed by: SURGERY

## 2024-08-29 PROCEDURE — 2580000003 HC RX 258: Performed by: SURGERY

## 2024-08-29 PROCEDURE — 6370000000 HC RX 637 (ALT 250 FOR IP): Performed by: INTERNAL MEDICINE

## 2024-08-29 PROCEDURE — 1200000000 HC SEMI PRIVATE

## 2024-08-29 PROCEDURE — 36415 COLL VENOUS BLD VENIPUNCTURE: CPT

## 2024-08-29 RX ORDER — INSULIN LISPRO 100 [IU]/ML
0-4 INJECTION, SOLUTION INTRAVENOUS; SUBCUTANEOUS NIGHTLY
Status: DISCONTINUED | OUTPATIENT
Start: 2024-08-29 | End: 2024-08-31 | Stop reason: HOSPADM

## 2024-08-29 RX ORDER — INSULIN LISPRO 100 [IU]/ML
10 INJECTION, SOLUTION INTRAVENOUS; SUBCUTANEOUS
Status: DISCONTINUED | OUTPATIENT
Start: 2024-08-29 | End: 2024-08-30

## 2024-08-29 RX ORDER — INSULIN LISPRO 100 [IU]/ML
0-8 INJECTION, SOLUTION INTRAVENOUS; SUBCUTANEOUS
Status: DISCONTINUED | OUTPATIENT
Start: 2024-08-29 | End: 2024-08-31 | Stop reason: HOSPADM

## 2024-08-29 RX ORDER — INSULIN LISPRO 100 [IU]/ML
14 INJECTION, SOLUTION INTRAVENOUS; SUBCUTANEOUS ONCE
Status: COMPLETED | OUTPATIENT
Start: 2024-08-29 | End: 2024-08-29

## 2024-08-29 RX ORDER — INSULIN GLARGINE 100 [IU]/ML
15 INJECTION, SOLUTION SUBCUTANEOUS 2 TIMES DAILY
Status: DISCONTINUED | OUTPATIENT
Start: 2024-08-29 | End: 2024-08-30

## 2024-08-29 RX ORDER — GLUCAGON 1 MG/ML
1 KIT INJECTION PRN
Status: DISCONTINUED | OUTPATIENT
Start: 2024-08-29 | End: 2024-08-31 | Stop reason: HOSPADM

## 2024-08-29 RX ORDER — DEXTROSE MONOHYDRATE 100 MG/ML
INJECTION, SOLUTION INTRAVENOUS CONTINUOUS PRN
Status: DISCONTINUED | OUTPATIENT
Start: 2024-08-29 | End: 2024-08-31 | Stop reason: HOSPADM

## 2024-08-29 RX ADMIN — METOPROLOL TARTRATE 50 MG: 50 TABLET, FILM COATED ORAL at 08:47

## 2024-08-29 RX ADMIN — ENOXAPARIN SODIUM 30 MG: 100 INJECTION SUBCUTANEOUS at 21:25

## 2024-08-29 RX ADMIN — FUROSEMIDE 40 MG: 40 TABLET ORAL at 08:46

## 2024-08-29 RX ADMIN — INSULIN LISPRO 14 UNITS: 100 INJECTION, SOLUTION INTRAVENOUS; SUBCUTANEOUS at 09:35

## 2024-08-29 RX ADMIN — CIPROFLOXACIN 400 MG: 2 INJECTION, SOLUTION INTRAVENOUS at 14:26

## 2024-08-29 RX ADMIN — SPIRONOLACTONE 25 MG: 25 TABLET ORAL at 08:47

## 2024-08-29 RX ADMIN — ACETAMINOPHEN 500 MG: 500 TABLET ORAL at 21:24

## 2024-08-29 RX ADMIN — ENOXAPARIN SODIUM 30 MG: 100 INJECTION SUBCUTANEOUS at 08:47

## 2024-08-29 RX ADMIN — INSULIN LISPRO 4 UNITS: 100 INJECTION, SOLUTION INTRAVENOUS; SUBCUTANEOUS at 12:15

## 2024-08-29 RX ADMIN — LISINOPRIL 2.5 MG: 5 TABLET ORAL at 08:46

## 2024-08-29 RX ADMIN — ACETAMINOPHEN 1000 MG: 500 TABLET ORAL at 14:11

## 2024-08-29 RX ADMIN — CYANOCOBALAMIN TAB 1000 MCG 500 MCG: 1000 TAB at 08:47

## 2024-08-29 RX ADMIN — Medication 10 ML: at 10:40

## 2024-08-29 RX ADMIN — Medication 1000 UNITS: at 08:47

## 2024-08-29 RX ADMIN — ACETAMINOPHEN 1000 MG: 500 TABLET ORAL at 06:00

## 2024-08-29 RX ADMIN — INSULIN LISPRO 10 UNITS: 100 INJECTION, SOLUTION INTRAVENOUS; SUBCUTANEOUS at 12:16

## 2024-08-29 RX ADMIN — OXYCODONE HYDROCHLORIDE 5 MG: 5 TABLET ORAL at 02:13

## 2024-08-29 RX ADMIN — INSULIN LISPRO 10 UNITS: 100 INJECTION, SOLUTION INTRAVENOUS; SUBCUTANEOUS at 17:22

## 2024-08-29 RX ADMIN — METRONIDAZOLE 500 MG: 500 INJECTION, SOLUTION INTRAVENOUS at 17:14

## 2024-08-29 RX ADMIN — OXYCODONE HYDROCHLORIDE 5 MG: 5 TABLET ORAL at 21:23

## 2024-08-29 RX ADMIN — METOCLOPRAMIDE 10 MG: 5 INJECTION, SOLUTION INTRAMUSCULAR; INTRAVENOUS at 10:45

## 2024-08-29 RX ADMIN — INSULIN GLARGINE 15 UNITS: 100 INJECTION, SOLUTION SUBCUTANEOUS at 12:14

## 2024-08-29 RX ADMIN — LEVOTHYROXINE SODIUM 112 MCG: 0.11 TABLET ORAL at 06:00

## 2024-08-29 RX ADMIN — CIPROFLOXACIN 400 MG: 2 INJECTION, SOLUTION INTRAVENOUS at 03:28

## 2024-08-29 RX ADMIN — OXYCODONE HYDROCHLORIDE 5 MG: 5 TABLET ORAL at 12:20

## 2024-08-29 RX ADMIN — ROSUVASTATIN 20 MG: 20 TABLET, FILM COATED ORAL at 08:46

## 2024-08-29 RX ADMIN — METRONIDAZOLE 500 MG: 500 INJECTION, SOLUTION INTRAVENOUS at 10:40

## 2024-08-29 RX ADMIN — Medication 10 ML: at 21:26

## 2024-08-29 ASSESSMENT — PAIN SCALES - WONG BAKER: WONGBAKER_NUMERICALRESPONSE: NO HURT

## 2024-08-29 ASSESSMENT — PAIN SCALES - GENERAL
PAINLEVEL_OUTOF10: 0
PAINLEVEL_OUTOF10: 8
PAINLEVEL_OUTOF10: 5
PAINLEVEL_OUTOF10: 6
PAINLEVEL_OUTOF10: 0
PAINLEVEL_OUTOF10: 3
PAINLEVEL_OUTOF10: 6

## 2024-08-29 ASSESSMENT — PAIN DESCRIPTION - LOCATION
LOCATION: GENERALIZED
LOCATION: ABDOMEN

## 2024-08-29 ASSESSMENT — PAIN DESCRIPTION - DESCRIPTORS
DESCRIPTORS: ACHING
DESCRIPTORS: ACHING

## 2024-08-29 ASSESSMENT — PAIN DESCRIPTION - PAIN TYPE: TYPE: SURGICAL PAIN

## 2024-08-29 ASSESSMENT — PAIN DESCRIPTION - ORIENTATION: ORIENTATION: MID

## 2024-08-29 ASSESSMENT — PAIN - FUNCTIONAL ASSESSMENT: PAIN_FUNCTIONAL_ASSESSMENT: ACTIVITIES ARE NOT PREVENTED

## 2024-08-29 NOTE — CONSULTS
[facility]    8/29/2024 4:50 PM    Patient Information: LOY LOCO   Date of Admit:  8/28/2024  Primary Care Physician:  Arnold Cr DO  Requesting Physician:  Giles Gallardo,*    Reason for consult:   Medical evaluation and recommendations for DM 2, Afib, CAD    Chief complaint: I had surgery colon cancer    History of Present Illness:  LOY LOCO is a 74 y.o. male on Giles Gallardo,* service who was admitted on 8/28/2024 for laparoscopic sigmoid colon resection for colon adenocarcinoma.  Patient with history of Afib on Eliquis, DM 2 on insulin at home, morbid obesity, CAD s/p CABG and HTN.  Patient has started CLD today.  Passing minimal flatus, no BM.  Has expected lower abdominal pain.  No CP, SOB, HA, dizziness, LH, nausea or vomiting.  No fevers, chills or NS.  Otherwise complete ROS is negative unless listed above.    REVIEW OF SYSTEMS:   Pertinent positives as noted in HPI.  All other systems were reviewed and are negative.      Past Medical History:   has a past medical history of Acute rhinosinusitis, Anxiety, Arthritis, CAD in native artery, Chronic back pain, Depression, Diabetes mellitus (HCC), Hernia of unspecified site of abdominal cavity without mention of obstruction or gangrene, Hyperlipidemia, Hypertension, Hypothyroidism, Kidney stones, Movement disorder, EMMA (obstructive sleep apnea), Osteoarthritis, PAF (paroxysmal atrial fibrillation) (HCC), Pneumonia, Type II or unspecified type diabetes mellitus without mention of complication, not stated as uncontrolled, and Vitamin D deficiency.     Past Surgical History:   has a past surgical history that includes Total knee arthroplasty; Cystoscopy (04/16/2010); Lithotripsy; Ureter stent placement; joint replacement; joint replacement; hernia repair; Cystoscopy (03/21/2011); Urethra dilation (03/21/2011);  kg (318 lb 11.2 oz)   SpO2 94%   BMI 44.45 kg/m²     General appearance: Appears comfortable. Well nourished  Eyes: Sclera clear, pupils equal  ENT: Moist mucus membranes, no thrush  Neck: Trachea midline, symmetrical  Cardiovascular: Regular rhythm, normal S1, S2. No murmur, gallop, rub. No edema in  lower extremities  Respiratory: Clear to auscultation bilaterally. No wheeze. Good inspiratory effort  Gastrointestinal: Abdomen soft, obese, expected tenderness, not distended, normal bowel sounds  Musculoskeletal: No cyanosis in digits, warm extremities  Neurologic: Cranial nerves grossly intact, no motor or speech deficits.  Psychiatric: Normal affect. Alert and oriented to time, place and person.  Skin: Warm, dry, normal turgor, no rash    Labs:  CBC:   Lab Results   Component Value Date/Time    WBC 11.6 08/29/2024 05:56 AM    RBC 4.40 08/29/2024 05:56 AM    HGB 11.7 08/29/2024 05:56 AM    HCT 36.2 08/29/2024 05:56 AM    MCV 82.4 08/29/2024 05:56 AM    MCH 26.6 08/29/2024 05:56 AM    MCHC 32.3 08/29/2024 05:56 AM    RDW 14.4 08/29/2024 05:56 AM     08/29/2024 05:56 AM    MPV 8.6 08/29/2024 05:56 AM     BMP:    Lab Results   Component Value Date/Time     08/29/2024 05:56 AM    K 5.1 08/29/2024 05:56 AM    K 5.1 07/09/2024 11:38 AM    CL 99 08/29/2024 05:56 AM    CO2 23 08/29/2024 05:56 AM    BUN 16 08/29/2024 05:56 AM    CREATININE 0.8 08/29/2024 05:56 AM    CALCIUM 9.2 08/29/2024 05:56 AM    GFRAA >60 06/20/2019 08:03 PM    GFRAA >60 08/27/2012 06:30 AM    LABGLOM >90 08/29/2024 05:56 AM    LABGLOM >60 03/22/2024 07:27 AM    GLUCOSE 284 08/29/2024 05:56 AM         Problem List:    Principal Problem:    Cancer of sigmoid colon (HCC)  Resolved Problems:    * No resolved hospital problems. *       Assessment & Plan:     IDDM 2  Start Lantus 15 U bid  Start Humalog 10 U with meals  Change SSI  Hypoglycemia orders  A1C is 8.4% on 8/28  Afib  Resume Eliquis when ok with Surgery  Continue

## 2024-08-29 NOTE — PLAN OF CARE
Problem: Chronic Conditions and Co-morbidities  Goal: Patient's chronic conditions and co-morbidity symptoms are monitored and maintained or improved  8/28/2024 2107 by Yodit Wen, RN  Outcome: Progressing  Flowsheets (Taken 8/28/2024 2024)  Care Plan - Patient's Chronic Conditions and Co-Morbidity Symptoms are Monitored and Maintained or Improved: Monitor and assess patient's chronic conditions and comorbid symptoms for stability, deterioration, or improvement  8/28/2024 1508 by Tom Ibanez, RN  Outcome: Progressing

## 2024-08-29 NOTE — PROGRESS NOTES
Pt evening shift assessment complete. VSS and medication given as ordered. New PIV placed, pt tolerated well. Pt assessed for comfort needs, assisted back to bed.  Pt does not express any additional needs at this time, resting comfortably in bed.

## 2024-08-29 NOTE — PROGRESS NOTES
[facility]    Surgery Progress Note           POD # 1    PATIENT NAME: Jason Kenney     TODAY'S DATE: 8/29/2024    SUBJECTIVE:    Pt up to chair, in good spirits, no N/V. No CP or SOB.  Has concerns about bl glc.     OBJECTIVE:   VITALS:  /69   Pulse 76   Temp 97.8 °F (36.6 °C) (Axillary)   Resp 16   Ht 1.803 m (5' 11\")   Wt (!) 144.6 kg (318 lb 11.2 oz)   SpO2 93%   BMI 44.45 kg/m²     INTAKE/OUTPUT:    I/O last 3 completed shifts:  In: 2302.1 [P.O.:60; I.V.:1200; IV Piggyback:1042.1]  Out: 2100 [Urine:2100]  No intake/output data recorded.              CONSTITUTIONAL:  awake and alert  LUNGS:  clear to auscultation  ABDOMEN:   normal bowel sounds, firm, non-distended, non-tender except mildly at incisions  INCISION: clean, dry, no drainage    Data:  CBC:   Recent Labs     08/29/24  0556   WBC 11.6*   HGB 11.7*   HCT 36.2*        BMP:    Recent Labs     08/29/24  0556   *   K 5.1   CL 99   CO2 23   BUN 16   CREATININE 0.8   GLUCOSE 284*     Hepatic: No results for input(s): \"AST\", \"ALT\", \"BILITOT\", \"ALKPHOS\" in the last 72 hours.    Invalid input(s): \"ALB\"  Mag:    No results for input(s): \"MG\" in the last 72 hours.   Phos:   No results for input(s): \"PHOS\" in the last 72 hours.   INR: No results for input(s): \"INR\" in the last 72 hours.    Radiology Review:  None    ASSESSMENT AND PLAN:  74 y.o. male status post laparoscopic sigmoid colectomy with colorectal anastomosis    Doing well  Remove loya  Clears  Hold IVF, needs new IV site  OOB, IS, ambulation  Appreciate IM assist with DM management  Op reviewed with him, all questions answered, path pending     Giles Gallardo MD

## 2024-08-29 NOTE — PROGRESS NOTES
Pt had questions about insulin dose.  MD made aware. MD gave order 14 U Humalog one time dose and states  he will address further when he sees pt.

## 2024-08-29 NOTE — ACP (ADVANCE CARE PLANNING)
Advanced Care Planning Note.    Purpose of Encounter: Advanced care planning in light of colon cancer  Parties In Attendance: Patient, nephew  Decisional Capacity: Yes  Subjective: Patient with expected abdominal pain  Objective: Cr 0.8  Goals of Care Determination: Patient wants full support (CPR, vent, surgery, HD, trach, PEG)  Plan:  Sigmoid resection, insulin, PT/OT  Code Status: Full code   Time spent on Advanced care Plannin minutes  Advanced Care Planning Documents: Completed advanced directives on chart, wife is the POA.    Joshua Mendoza MD  2024 4:56 PM

## 2024-08-30 LAB
ANION GAP SERPL CALCULATED.3IONS-SCNC: 13 MMOL/L (ref 3–16)
BASOPHILS # BLD: 0 K/UL (ref 0–0.2)
BASOPHILS NFR BLD: 0.3 %
BUN SERPL-MCNC: 14 MG/DL (ref 7–20)
CALCIUM SERPL-MCNC: 9.1 MG/DL (ref 8.3–10.6)
CHLORIDE SERPL-SCNC: 100 MMOL/L (ref 99–110)
CO2 SERPL-SCNC: 21 MMOL/L (ref 21–32)
CREAT SERPL-MCNC: 0.6 MG/DL (ref 0.8–1.3)
DEPRECATED RDW RBC AUTO: 14.5 % (ref 12.4–15.4)
EOSINOPHIL # BLD: 0.3 K/UL (ref 0–0.6)
EOSINOPHIL NFR BLD: 3.7 %
GFR SERPLBLD CREATININE-BSD FMLA CKD-EPI: >90 ML/MIN/{1.73_M2}
GLUCOSE BLD-MCNC: 161 MG/DL (ref 70–99)
GLUCOSE BLD-MCNC: 191 MG/DL (ref 70–99)
GLUCOSE BLD-MCNC: 217 MG/DL (ref 70–99)
GLUCOSE BLD-MCNC: 227 MG/DL (ref 70–99)
GLUCOSE BLD-MCNC: 250 MG/DL (ref 70–99)
GLUCOSE SERPL-MCNC: 209 MG/DL (ref 70–99)
HCT VFR BLD AUTO: 34.6 % (ref 40.5–52.5)
HGB BLD-MCNC: 11.7 G/DL (ref 13.5–17.5)
LYMPHOCYTES # BLD: 1.8 K/UL (ref 1–5.1)
LYMPHOCYTES NFR BLD: 19.6 %
MCH RBC QN AUTO: 27.4 PG (ref 26–34)
MCHC RBC AUTO-ENTMCNC: 33.7 G/DL (ref 31–36)
MCV RBC AUTO: 81.4 FL (ref 80–100)
MONOCYTES # BLD: 0.8 K/UL (ref 0–1.3)
MONOCYTES NFR BLD: 8.8 %
NEUTROPHILS # BLD: 6.3 K/UL (ref 1.7–7.7)
NEUTROPHILS NFR BLD: 67.6 %
PERFORMED ON: ABNORMAL
PLATELET # BLD AUTO: 213 K/UL (ref 135–450)
PMV BLD AUTO: 8.3 FL (ref 5–10.5)
POTASSIUM SERPL-SCNC: 4.2 MMOL/L (ref 3.5–5.1)
RBC # BLD AUTO: 4.25 M/UL (ref 4.2–5.9)
SODIUM SERPL-SCNC: 134 MMOL/L (ref 136–145)
WBC # BLD AUTO: 9.3 K/UL (ref 4–11)

## 2024-08-30 PROCEDURE — 99024 POSTOP FOLLOW-UP VISIT: CPT | Performed by: SURGERY

## 2024-08-30 PROCEDURE — 6370000000 HC RX 637 (ALT 250 FOR IP): Performed by: SURGERY

## 2024-08-30 PROCEDURE — 6360000002 HC RX W HCPCS: Performed by: SURGERY

## 2024-08-30 PROCEDURE — 2580000003 HC RX 258: Performed by: INTERNAL MEDICINE

## 2024-08-30 PROCEDURE — 2500000003 HC RX 250 WO HCPCS: Performed by: INTERNAL MEDICINE

## 2024-08-30 PROCEDURE — 80048 BASIC METABOLIC PNL TOTAL CA: CPT

## 2024-08-30 PROCEDURE — 1200000000 HC SEMI PRIVATE

## 2024-08-30 PROCEDURE — 6370000000 HC RX 637 (ALT 250 FOR IP): Performed by: INTERNAL MEDICINE

## 2024-08-30 PROCEDURE — 85025 COMPLETE CBC W/AUTO DIFF WBC: CPT

## 2024-08-30 PROCEDURE — 36415 COLL VENOUS BLD VENIPUNCTURE: CPT

## 2024-08-30 PROCEDURE — 6370000000 HC RX 637 (ALT 250 FOR IP): Performed by: HOSPITALIST

## 2024-08-30 PROCEDURE — 2580000003 HC RX 258: Performed by: SURGERY

## 2024-08-30 RX ORDER — INSULIN LISPRO 100 [IU]/ML
15 INJECTION, SOLUTION INTRAVENOUS; SUBCUTANEOUS
Status: DISCONTINUED | OUTPATIENT
Start: 2024-08-30 | End: 2024-08-30

## 2024-08-30 RX ORDER — INSULIN LISPRO 100 [IU]/ML
10 INJECTION, SOLUTION INTRAVENOUS; SUBCUTANEOUS
Status: DISCONTINUED | OUTPATIENT
Start: 2024-08-31 | End: 2024-08-30

## 2024-08-30 RX ORDER — INSULIN GLARGINE 100 [IU]/ML
20 INJECTION, SOLUTION SUBCUTANEOUS 2 TIMES DAILY
Status: DISCONTINUED | OUTPATIENT
Start: 2024-08-30 | End: 2024-08-31 | Stop reason: HOSPADM

## 2024-08-30 RX ORDER — INSULIN LISPRO 100 [IU]/ML
10 INJECTION, SOLUTION INTRAVENOUS; SUBCUTANEOUS
Status: DISCONTINUED | OUTPATIENT
Start: 2024-08-30 | End: 2024-08-31 | Stop reason: HOSPADM

## 2024-08-30 RX ADMIN — SODIUM CHLORIDE, PRESERVATIVE FREE 20 MG: 5 INJECTION INTRAVENOUS at 10:01

## 2024-08-30 RX ADMIN — Medication 10 ML: at 21:39

## 2024-08-30 RX ADMIN — ACETAMINOPHEN 1000 MG: 500 TABLET ORAL at 21:38

## 2024-08-30 RX ADMIN — FUROSEMIDE 40 MG: 40 TABLET ORAL at 08:41

## 2024-08-30 RX ADMIN — METRONIDAZOLE 500 MG: 500 INJECTION, SOLUTION INTRAVENOUS at 00:53

## 2024-08-30 RX ADMIN — OXYCODONE HYDROCHLORIDE 5 MG: 5 TABLET ORAL at 01:56

## 2024-08-30 RX ADMIN — ENOXAPARIN SODIUM 30 MG: 100 INJECTION SUBCUTANEOUS at 21:39

## 2024-08-30 RX ADMIN — ACETAMINOPHEN 1000 MG: 500 TABLET ORAL at 06:19

## 2024-08-30 RX ADMIN — METOPROLOL TARTRATE 50 MG: 50 TABLET, FILM COATED ORAL at 08:41

## 2024-08-30 RX ADMIN — SPIRONOLACTONE 25 MG: 25 TABLET ORAL at 08:42

## 2024-08-30 RX ADMIN — CYANOCOBALAMIN TAB 1000 MCG 500 MCG: 1000 TAB at 08:41

## 2024-08-30 RX ADMIN — INSULIN LISPRO 2 UNITS: 100 INJECTION, SOLUTION INTRAVENOUS; SUBCUTANEOUS at 08:44

## 2024-08-30 RX ADMIN — ROSUVASTATIN 20 MG: 20 TABLET, FILM COATED ORAL at 08:41

## 2024-08-30 RX ADMIN — LEVOTHYROXINE SODIUM 112 MCG: 0.11 TABLET ORAL at 06:19

## 2024-08-30 RX ADMIN — OXYCODONE HYDROCHLORIDE 5 MG: 5 TABLET ORAL at 06:19

## 2024-08-30 RX ADMIN — INSULIN LISPRO 10 UNITS: 100 INJECTION, SOLUTION INTRAVENOUS; SUBCUTANEOUS at 11:37

## 2024-08-30 RX ADMIN — INSULIN GLARGINE 15 UNITS: 100 INJECTION, SOLUTION SUBCUTANEOUS at 08:45

## 2024-08-30 RX ADMIN — INSULIN LISPRO 4 UNITS: 100 INJECTION, SOLUTION INTRAVENOUS; SUBCUTANEOUS at 11:37

## 2024-08-30 RX ADMIN — ENOXAPARIN SODIUM 30 MG: 100 INJECTION SUBCUTANEOUS at 08:42

## 2024-08-30 RX ADMIN — ACETAMINOPHEN 1000 MG: 500 TABLET ORAL at 14:42

## 2024-08-30 RX ADMIN — INSULIN LISPRO 10 UNITS: 100 INJECTION, SOLUTION INTRAVENOUS; SUBCUTANEOUS at 19:31

## 2024-08-30 RX ADMIN — INSULIN GLARGINE 20 UNITS: 100 INJECTION, SOLUTION SUBCUTANEOUS at 21:39

## 2024-08-30 RX ADMIN — SODIUM CHLORIDE, PRESERVATIVE FREE 20 MG: 5 INJECTION INTRAVENOUS at 21:40

## 2024-08-30 RX ADMIN — LISINOPRIL 2.5 MG: 5 TABLET ORAL at 08:46

## 2024-08-30 RX ADMIN — Medication 1000 UNITS: at 08:41

## 2024-08-30 RX ADMIN — METOPROLOL TARTRATE 50 MG: 50 TABLET, FILM COATED ORAL at 21:39

## 2024-08-30 RX ADMIN — INSULIN LISPRO 10 UNITS: 100 INJECTION, SOLUTION INTRAVENOUS; SUBCUTANEOUS at 08:44

## 2024-08-30 ASSESSMENT — PAIN DESCRIPTION - LOCATION
LOCATION: ABDOMEN

## 2024-08-30 ASSESSMENT — PAIN DESCRIPTION - DESCRIPTORS
DESCRIPTORS: ACHING
DESCRIPTORS: ACHING

## 2024-08-30 ASSESSMENT — PAIN - FUNCTIONAL ASSESSMENT: PAIN_FUNCTIONAL_ASSESSMENT: ACTIVITIES ARE NOT PREVENTED

## 2024-08-30 ASSESSMENT — PAIN DESCRIPTION - ORIENTATION: ORIENTATION: UPPER

## 2024-08-30 ASSESSMENT — PAIN SCALES - GENERAL
PAINLEVEL_OUTOF10: 6
PAINLEVEL_OUTOF10: 0
PAINLEVEL_OUTOF10: 4

## 2024-08-30 NOTE — PROGRESS NOTES
[facility]    Surgery Progress Note           POD # 2    PATIENT NAME: Jason Kenney     TODAY'S DATE: 8/30/2024    SUBJECTIVE:    Pt up to chair, in good spirits, no N/V. No CP or SOB.  Has been up and walking.     OBJECTIVE:   VITALS:  /74   Pulse 74   Temp 97.8 °F (36.6 °C) (Oral)   Resp 16   Ht 1.803 m (5' 11\")   Wt (!) 142 kg (313 lb)   SpO2 93%   BMI 43.65 kg/m²     INTAKE/OUTPUT:    I/O last 3 completed shifts:  In: 1132.1 [P.O.:540; IV Piggyback:592.1]  Out: 1950 [Urine:1950]  No intake/output data recorded.              CONSTITUTIONAL:  awake and alert  LUNGS:  clear to auscultation  ABDOMEN:   normal bowel sounds, firm, non-distended, non-tender except minimally at incisions  INCISION: clean, dry, no drainage    Data:  CBC:   Recent Labs     08/29/24  0556 08/30/24  0517   WBC 11.6* 9.3   HGB 11.7* 11.7*   HCT 36.2* 34.6*    213     BMP:    Recent Labs     08/29/24  0556 08/30/24  0517   * 134*   K 5.1 4.2   CL 99 100   CO2 23 21   BUN 16 14   CREATININE 0.8 0.6*   GLUCOSE 284* 209*     Hepatic: No results for input(s): \"AST\", \"ALT\", \"BILITOT\", \"ALKPHOS\" in the last 72 hours.    Invalid input(s): \"ALB\"  Mag:    No results for input(s): \"MG\" in the last 72 hours.   Phos:   No results for input(s): \"PHOS\" in the last 72 hours.   INR: No results for input(s): \"INR\" in the last 72 hours.    Radiology Review:  None    ASSESSMENT AND PLAN:  74 y.o. male status post laparoscopic sigmoid colectomy with colorectal anastomosis    Doing well  Voiding with loya out  Reg diabetic diet  Off IVF  OOB, IS, ambulation  Appreciate IM assist with DM management  Likely discharge in am    Giles Gallardo MD

## 2024-08-30 NOTE — PROGRESS NOTES
-PM assessment complete, vitals stable, lap sites to abdomen CDI, medications given per MAR.   -Patient's blood sugar was 162, patient is worried about his blood sugar dropping overnight, patient refused evening dose of scheduled Lantus.   -Patient ambulating independently, not passing flatus yet, voiding in bathroom.

## 2024-08-30 NOTE — PROGRESS NOTES
Hospitalist Progress Note      PCP: Arnold Cr DO    Date of Admission: 8/28/2024    Chief Complaint:  \"I had surgery for colon cancer\"    Hospital Course:  74 y.o. male on Giles Gallardo,* service who was admitted on 8/28/2024 for laparoscopic sigmoid colon resection for colon adenocarcinoma.  Patient with history of Afib on Eliquis, DM 2 on insulin at home, morbid obesity, CAD s/p CABG and HTN.      Subjective:   Patient denies flatus or BM.  Walking around.  Expected abdominal pain is tolerable.  No CP, SOB, HA or fevers.       Medications:  Reviewed    Infusion Medications    dextrose      sodium chloride       Scheduled Medications    famotidine (PEPCID) injection  20 mg IntraVENous BID    insulin glargine  20 Units SubCUTAneous BID    insulin lispro  15 Units SubCUTAneous TID WC    insulin lispro  0-8 Units SubCUTAneous TID WC    insulin lispro  0-4 Units SubCUTAneous Nightly    furosemide  40 mg Oral Daily    levothyroxine  112 mcg Oral Daily    lisinopril  2.5 mg Oral Daily    metoprolol tartrate  50 mg Oral BID    rosuvastatin  20 mg Oral Daily    spironolactone  25 mg Oral Daily    vitamin B-12  500 mcg Oral Daily    Vitamin D  1,000 Units Oral Daily    sodium chloride flush  5-40 mL IntraVENous 2 times per day    acetaminophen  1,000 mg Oral 3 times per day    enoxaparin  30 mg SubCUTAneous BID     PRN Meds: dextrose bolus **OR** dextrose bolus, glucagon (rDNA), dextrose, sodium chloride flush, sodium chloride, oxyCODONE, morphine, ondansetron      Intake/Output Summary (Last 24 hours) at 8/30/2024 1344  Last data filed at 8/29/2024 1714  Gross per 24 hour   Intake 240 ml   Output --   Net 240 ml       Physical Exam Performed:    BP (!) 146/76   Pulse 68   Temp 97.9 °F (36.6 °C) (Oral)   Resp 18   Ht 1.803 m (5' 11\")   Wt (!) 142 kg (313 lb)   SpO2 95%   BMI 43.65 kg/m²     General appearance: Appears comfortable. Well nourished  Eyes: Sclera clear, pupils equal  ENT: Moist

## 2024-08-30 NOTE — PROGRESS NOTES
Pt resting up in chair. Has been ambulating in halls overnight independently. Neg flatus so far. Encourage to continue progressive ambulation. Pt is axox4 and able to answer questions and follow commands throughout assessment. PO meds taken easily with water. Denied need for pain med at this time. Call light in easy reach. Denies other needs.

## 2024-08-30 NOTE — PROGRESS NOTES
[facility] - Inpatient Rehabilitation Department   Phone: [phone]    Physical Therapy    [x] Initial Evaluation            [] Daily Treatment Note         [] Discharge Summary      Patient: Jason Kenney   : 1950   MRN: [id number]   Date of Service:  2024  Admitting Diagnosis: Cancer of sigmoid colon (HCC)  Current Admission Summary: LAPAROSCOPIC SIGMOID COLON RESECTION FLEXIBLE SIGMOIDOSCOPY   Past Medical History:  has a past medical history of Acute rhinosinusitis, Anxiety, Arthritis, CAD in native artery, Chronic back pain, Depression, Diabetes mellitus (HCC), Hernia of unspecified site of abdominal cavity without mention of obstruction or gangrene, Hyperlipidemia, Hypertension, Hypothyroidism, Kidney stones, Movement disorder, EMMA (obstructive sleep apnea), Osteoarthritis, PAF (paroxysmal atrial fibrillation) (HCC), Pneumonia, Type II or unspecified type diabetes mellitus without mention of complication, not stated as uncontrolled, and Vitamin D deficiency.  Past Surgical History:  has a past surgical history that includes Total knee arthroplasty; Cystoscopy (2010); Lithotripsy; Ureter stent placement; joint replacement; joint replacement; hernia repair; Cystoscopy (2011); Urethra dilation (2011); Cholecystectomy; Eye surgery (Left, 10/01/2023); Upper gastrointestinal endoscopy (N/A, 2024); Esophagus dilation (2024); Colonoscopy (N/A, 7/15/2024); Colonoscopy (7/15/2024); Colonoscopy (7/15/2024); colectomy (N/A, 2024); and proctosigmoidoscopy (N/A, 2024).  Discharge Recommendations: ***  DME Required For Discharge: {FFOTD/C EQUIPMENT:03723}  Precautions/Restrictions: high fall risk, up as tolerated  Weight Bearing Restrictions: no restrictions  [] Right Upper Extremity  [] Left Upper Extremity [] Right Lower Extremity  [] Left Lower Extremity     Required Braces/Orthotics: no braces required   [] Right  [] Left  Positional Restrictions:no  {ffptlearning barriers:51603}  Patient Education: patient educated on {ffpteducation:25277}  Learning Assessment:  {ffptlearnin}    Assessment  Activity Tolerance: ***  Impairments Requiring Therapeutic Intervention: {ffptimpairments:25769}  Prognosis: {ffptprognosis:74947}  Clinical Assessment: ***  Safety Interventions: {ffptsafety:19958}    Plan  Frequency: {ffptfrequency:69049}  Current Treatment Recommendations: {ffpttreatmentrecommendations:74582}    Goals  Patient Goals: ***   Short Term Goals:  Time Frame: ***  {ffptgoals:55634}    Above goals reviewed on 2024.  All goals are ongoing at this time unless indicated above.      Therapy Session Time      Individual Group Co-treatment   Time In         Time Out         Minutes           Timed Code Treatment Minutes:     Total Treatment Minutes:         Electronically Signed By: JACOB MCKEON, PT

## 2024-08-31 VITALS
HEIGHT: 71 IN | SYSTOLIC BLOOD PRESSURE: 138 MMHG | RESPIRATION RATE: 16 BRPM | DIASTOLIC BLOOD PRESSURE: 79 MMHG | WEIGHT: 305.8 LBS | HEART RATE: 73 BPM | OXYGEN SATURATION: 94 % | BODY MASS INDEX: 42.81 KG/M2 | TEMPERATURE: 98.4 F

## 2024-08-31 LAB
ANION GAP SERPL CALCULATED.3IONS-SCNC: 13 MMOL/L (ref 3–16)
BASOPHILS # BLD: 0.1 K/UL (ref 0–0.2)
BASOPHILS NFR BLD: 0.7 %
BUN SERPL-MCNC: 14 MG/DL (ref 7–20)
CALCIUM SERPL-MCNC: 9.7 MG/DL (ref 8.3–10.6)
CHLORIDE SERPL-SCNC: 100 MMOL/L (ref 99–110)
CO2 SERPL-SCNC: 22 MMOL/L (ref 21–32)
CREAT SERPL-MCNC: 0.8 MG/DL (ref 0.8–1.3)
DEPRECATED RDW RBC AUTO: 14.5 % (ref 12.4–15.4)
EOSINOPHIL # BLD: 0.5 K/UL (ref 0–0.6)
EOSINOPHIL NFR BLD: 4.8 %
GFR SERPLBLD CREATININE-BSD FMLA CKD-EPI: >90 ML/MIN/{1.73_M2}
GLUCOSE BLD-MCNC: 257 MG/DL (ref 70–99)
GLUCOSE BLD-MCNC: 275 MG/DL (ref 70–99)
GLUCOSE SERPL-MCNC: 256 MG/DL (ref 70–99)
HCT VFR BLD AUTO: 39.2 % (ref 40.5–52.5)
HGB BLD-MCNC: 12.6 G/DL (ref 13.5–17.5)
LYMPHOCYTES # BLD: 2.5 K/UL (ref 1–5.1)
LYMPHOCYTES NFR BLD: 26.4 %
MCH RBC QN AUTO: 26.6 PG (ref 26–34)
MCHC RBC AUTO-ENTMCNC: 32.2 G/DL (ref 31–36)
MCV RBC AUTO: 82.6 FL (ref 80–100)
MONOCYTES # BLD: 0.9 K/UL (ref 0–1.3)
MONOCYTES NFR BLD: 9.4 %
NEUTROPHILS # BLD: 5.6 K/UL (ref 1.7–7.7)
NEUTROPHILS NFR BLD: 58.7 %
PERFORMED ON: ABNORMAL
PERFORMED ON: ABNORMAL
PLATELET # BLD AUTO: 274 K/UL (ref 135–450)
PMV BLD AUTO: 9 FL (ref 5–10.5)
POTASSIUM SERPL-SCNC: 4.5 MMOL/L (ref 3.5–5.1)
RBC # BLD AUTO: 4.74 M/UL (ref 4.2–5.9)
SODIUM SERPL-SCNC: 135 MMOL/L (ref 136–145)
WBC # BLD AUTO: 9.6 K/UL (ref 4–11)

## 2024-08-31 PROCEDURE — 6370000000 HC RX 637 (ALT 250 FOR IP): Performed by: SURGERY

## 2024-08-31 PROCEDURE — 80048 BASIC METABOLIC PNL TOTAL CA: CPT

## 2024-08-31 PROCEDURE — 2580000003 HC RX 258: Performed by: INTERNAL MEDICINE

## 2024-08-31 PROCEDURE — 85025 COMPLETE CBC W/AUTO DIFF WBC: CPT

## 2024-08-31 PROCEDURE — 2580000003 HC RX 258: Performed by: SURGERY

## 2024-08-31 PROCEDURE — 6360000002 HC RX W HCPCS: Performed by: SURGERY

## 2024-08-31 PROCEDURE — 2500000003 HC RX 250 WO HCPCS: Performed by: INTERNAL MEDICINE

## 2024-08-31 PROCEDURE — 99024 POSTOP FOLLOW-UP VISIT: CPT | Performed by: SURGERY

## 2024-08-31 PROCEDURE — 6370000000 HC RX 637 (ALT 250 FOR IP): Performed by: HOSPITALIST

## 2024-08-31 PROCEDURE — 6370000000 HC RX 637 (ALT 250 FOR IP): Performed by: INTERNAL MEDICINE

## 2024-08-31 RX ORDER — OXYCODONE AND ACETAMINOPHEN 5; 325 MG/1; MG/1
1-2 TABLET ORAL EVERY 6 HOURS PRN
Qty: 15 TABLET | Refills: 0 | Status: SHIPPED | OUTPATIENT
Start: 2024-08-31 | End: 2024-09-05

## 2024-08-31 RX ADMIN — SODIUM CHLORIDE, PRESERVATIVE FREE 20 MG: 5 INJECTION INTRAVENOUS at 09:34

## 2024-08-31 RX ADMIN — SPIRONOLACTONE 25 MG: 25 TABLET ORAL at 09:33

## 2024-08-31 RX ADMIN — INSULIN LISPRO 10 UNITS: 100 INJECTION, SOLUTION INTRAVENOUS; SUBCUTANEOUS at 12:13

## 2024-08-31 RX ADMIN — CYANOCOBALAMIN TAB 1000 MCG 500 MCG: 1000 TAB at 09:33

## 2024-08-31 RX ADMIN — Medication 10 ML: at 09:45

## 2024-08-31 RX ADMIN — FUROSEMIDE 40 MG: 40 TABLET ORAL at 09:34

## 2024-08-31 RX ADMIN — INSULIN GLARGINE 20 UNITS: 100 INJECTION, SOLUTION SUBCUTANEOUS at 09:31

## 2024-08-31 RX ADMIN — Medication 1000 UNITS: at 09:34

## 2024-08-31 RX ADMIN — ROSUVASTATIN 20 MG: 20 TABLET, FILM COATED ORAL at 09:34

## 2024-08-31 RX ADMIN — ACETAMINOPHEN 1000 MG: 500 TABLET ORAL at 05:16

## 2024-08-31 RX ADMIN — INSULIN LISPRO 10 UNITS: 100 INJECTION, SOLUTION INTRAVENOUS; SUBCUTANEOUS at 09:32

## 2024-08-31 RX ADMIN — INSULIN LISPRO 4 UNITS: 100 INJECTION, SOLUTION INTRAVENOUS; SUBCUTANEOUS at 09:30

## 2024-08-31 RX ADMIN — ENOXAPARIN SODIUM 30 MG: 100 INJECTION SUBCUTANEOUS at 09:33

## 2024-08-31 RX ADMIN — LISINOPRIL 2.5 MG: 5 TABLET ORAL at 09:34

## 2024-08-31 RX ADMIN — METOPROLOL TARTRATE 50 MG: 50 TABLET, FILM COATED ORAL at 09:34

## 2024-08-31 RX ADMIN — LEVOTHYROXINE SODIUM 112 MCG: 0.11 TABLET ORAL at 05:16

## 2024-08-31 RX ADMIN — INSULIN LISPRO 4 UNITS: 100 INJECTION, SOLUTION INTRAVENOUS; SUBCUTANEOUS at 12:13

## 2024-08-31 ASSESSMENT — PAIN - FUNCTIONAL ASSESSMENT: PAIN_FUNCTIONAL_ASSESSMENT: ACTIVITIES ARE NOT PREVENTED

## 2024-08-31 ASSESSMENT — PAIN DESCRIPTION - PAIN TYPE
TYPE: SURGICAL PAIN
TYPE: SURGICAL PAIN

## 2024-08-31 ASSESSMENT — PAIN DESCRIPTION - ORIENTATION
ORIENTATION: MID
ORIENTATION: MID

## 2024-08-31 ASSESSMENT — PAIN SCALES - GENERAL
PAINLEVEL_OUTOF10: 4
PAINLEVEL_OUTOF10: 6

## 2024-08-31 ASSESSMENT — PAIN DESCRIPTION - LOCATION
LOCATION: ABDOMEN
LOCATION: ABDOMEN

## 2024-08-31 ASSESSMENT — PAIN DESCRIPTION - DESCRIPTORS
DESCRIPTORS: ACHING
DESCRIPTORS: ACHING

## 2024-08-31 NOTE — PROGRESS NOTES
Jason Kenney is a 74 y.o. male patient.    Current Facility-Administered Medications   Medication Dose Route Frequency Provider Last Rate Last Admin    famotidine (PEPCID) 20 mg in sodium chloride (PF) 0.9 % 10 mL injection  20 mg IntraVENous BID Joshua Mendoza MD   20 mg at 08/31/24 0934    insulin glargine (LANTUS) injection vial 20 Units  20 Units SubCUTAneous BID Joshua Mendoza MD   20 Units at 08/31/24 0931    insulin lispro (HUMALOG,ADMELOG) injection vial 10 Units  10 Units SubCUTAneous TID  Shey Mendoza MD   10 Units at 08/31/24 0932    dextrose bolus 10% 125 mL  125 mL IntraVENous PRN Joshua Mendoza MD        Or    dextrose bolus 10% 250 mL  250 mL IntraVENous PRN Joshua Mendoza MD        glucagon injection 1 mg  1 mg SubCUTAneous PRN Joshua Mendoza MD        dextrose 10 % infusion   IntraVENous Continuous PRN Joshua Mendoza MD        insulin lispro (HUMALOG,ADMELOG) injection vial 0-8 Units  0-8 Units SubCUTAneous TID WC Joshua Mendoza MD   4 Units at 08/31/24 0930    insulin lispro (HUMALOG,ADMELOG) injection vial 0-4 Units  0-4 Units SubCUTAneous Nightly Joshua Mendoza MD        furosemide (LASIX) tablet 40 mg  40 mg Oral Daily Giles Gallardo MD   40 mg at 08/31/24 0934    levothyroxine (SYNTHROID) tablet 112 mcg  112 mcg Oral Daily Giles Gallardo MD   112 mcg at 08/31/24 0516    lisinopril (PRINIVIL;ZESTRIL) tablet 2.5 mg  2.5 mg Oral Daily Giles Gallardo MD   2.5 mg at 08/31/24 0934    metoprolol tartrate (LOPRESSOR) tablet 50 mg  50 mg Oral BID Giles Gallardo MD   50 mg at 08/31/24 0934    rosuvastatin (CRESTOR) tablet 20 mg  20 mg Oral Daily Giles Gallardo MD   20 mg at 08/31/24 0934    spironolactone (ALDACTONE) tablet 25 mg  25 mg Oral Daily Giles Gallardo MD   25 mg at 08/31/24 0933    vitamin B-12 (CYANOCOBALAMIN) tablet 500 mcg  500 mcg Oral Daily Giles Gallardo MD   500 mcg at 08/31/24 0933    Vitamin D (CHOLECALCIFEROL) tablet    Neurological: Patient is alert and oriented to person, place and time.    Skin:  Warm and dry.      Assessment & Plan 74-year-old male who is postop day #3 status post laparoscopic sigmoid colon resection.  Doing well postoperatively.  Discharge home today.    Alonso Kate MD  8/31/2024

## 2024-08-31 NOTE — DISCHARGE SUMMARY
[place] [id number]                            DISCHARGE SUMMARY      PATIENT NAME: LOY LOCO               : 1950  MED REC NO: [id number]                      ROOM: [address] [place] [address]  ACCOUNT NO: [id number]                       ADMIT DATE: 2024  PROVIDER: Adam Kate MD      ADMITTING DIAGNOSIS:  Sigmoid colon cancer.    DISCHARGE DIAGNOSIS:  Sigmoid colon cancer.    PROCEDURE:  2024, laparoscopic sigmoid colon resection.    CONDITION UPON DISCHARGE:  Stable.    HOSPITAL COURSE:  The patient is a 74-year-old male, who underwent laparoscopic sigmoid colon resection on 2024.  Postoperatively, he did well.  By postop day #3, he met discharge criteria.  He was discharged home with a prescription for Percocet as needed for pain.  He will resume all home medications and follow up with Dr. Gallardo in the office in approximately 2 weeks.          ADAM KATE MD      D:  2024 12:13:06     T:  2024 17:48:29     [facility]  Job #:  [id number]     Doc#:  [id number]

## 2024-08-31 NOTE — DISCHARGE INSTRUCTIONS
Postoperative Instructions      Contact information     Office number - [phone]  Office hours are 8 am - 5 pm  Monday - Friday   Contact the doctor on call during the evenings ( 5 pm - 8 am ) or on Weekends for urgent or emergent issues by using the main office number ( [phone] ).  Please hold routine questions until normal business hours.        Wound care     The incisions are closed with dissolvable sutures which are beneath the skin. Surgical glue is then applied to the skin. The glue is purple in color and should be left undisturbed until your follow-up appointment.  No bandages are required over the surgical glue.  It is okay to shower but do not bathe in a bathtub.  Gently wash over the incisions with soap and water and then pat them dry with a towel. Contact the office for redness of the skin surrounding the incision or if there is drainage of pus.        Activities    It is generally okay to go up and down stairs. Please be careful as your gate may be unsteady from the surgery or pain medications.    Driving: Do not drive while on narcotic pain medications or while still under the effect of narcotic pain medications. Make sure that you are moving comfortably and not limited by postoperative pain or weakness that would make it difficult to react in an emergency situation.    Exercise : The main purpose of the activity restrictions is to reduce the risk of developing a hernia at an incision site.                    Do not lift greater than 25 lbs                   Avoid strenuous abdominal exercises- sit ups, core workouts                   Light cardiovascular exercise is generally okay                   Ask your doctor about the length of the exercise restrictions    Follow up     Please call the office for any concerns between the time of surgery and your follow up appointment.  For your convenience,  we ask that you schedule your follow up appointment about 2 weeks from the time of surgery at a time which works best with your schedule. Please ask about virtual follow up as this is a very convenient way to follow up from some surgeries.        Return to work is discussed with the doctor on an individual case basis.  If you need documentation for return to work or FMLA paperwork, please contact the office at [phone].

## 2024-08-31 NOTE — PROGRESS NOTES
Physical/Occupational Therapy  Jason Kenney  Orders received, chart reviewed. Pt standing in restroom independently on arrival, states that he does not need PT/OT. Pt has been ambulating in the hallways independently. Will D/C orders.   Thank you,  Allie De Paz PT, DPT [id number]  Fatou Ragsdale MSOT, OTR/L [id number]

## 2024-08-31 NOTE — PROGRESS NOTES
Hospitalist Progress Note      PCP: Arnold Cr DO    Date of Admission: 8/28/2024    Chief Complaint:  \"I had surgery for colon cancer\"    Hospital Course:  74 y.o. male on Giles Gallardo,* service who was admitted on 8/28/2024 for laparoscopic sigmoid colon resection for colon adenocarcinoma.  Patient with history of Afib on Eliquis, DM 2 on insulin at home, morbid obesity, CAD s/p CABG and HTN.      Subjective:   Patient denies flatus or BM.  Walking around.  Expected abdominal pain is tolerable.  No CP, SOB, HA or fevers.       Medications:  Reviewed    Infusion Medications    dextrose      sodium chloride       Scheduled Medications    famotidine (PEPCID) injection  20 mg IntraVENous BID    insulin glargine  20 Units SubCUTAneous BID    insulin lispro  10 Units SubCUTAneous TID WC    insulin lispro  0-8 Units SubCUTAneous TID WC    insulin lispro  0-4 Units SubCUTAneous Nightly    furosemide  40 mg Oral Daily    levothyroxine  112 mcg Oral Daily    lisinopril  2.5 mg Oral Daily    metoprolol tartrate  50 mg Oral BID    rosuvastatin  20 mg Oral Daily    spironolactone  25 mg Oral Daily    vitamin B-12  500 mcg Oral Daily    Vitamin D  1,000 Units Oral Daily    sodium chloride flush  5-40 mL IntraVENous 2 times per day    enoxaparin  30 mg SubCUTAneous BID     PRN Meds: dextrose bolus **OR** dextrose bolus, glucagon (rDNA), dextrose, sodium chloride flush, sodium chloride, oxyCODONE, morphine, ondansetron    No intake or output data in the 24 hours ending 08/31/24 1249      Physical Exam Performed:    /79   Pulse 73   Temp 98.4 °F (36.9 °C) (Axillary)   Resp 16   Ht 1.803 m (5' 11\")   Wt (!) 138.7 kg (305 lb 12.8 oz)   SpO2 94%   BMI 42.65 kg/m²     General appearance: Appears comfortable. Well nourished  Eyes: Sclera clear, pupils equal  ENT: Moist mucus membranes, no thrush  Neck: Trachea midline, symmetrical  Cardiovascular: Regular rhythm, normal S1, S2. No murmur, gallop,  rub. No edema in  lower extremities  Respiratory: Clear to auscultation bilaterally. No wheeze. Good inspiratory effort  Gastrointestinal: Abdomen soft, obese, expected tenderness, mildly distended, normal bowel sounds  Musculoskeletal: No cyanosis in digits, warm extremities  Neurologic: Cranial nerves grossly intact, no motor or speech deficits.  Psychiatric: Normal affect. Alert and oriented to time, place and person.  Skin: Abdominal incisions appear C/D/I      Labs:   Recent Labs     08/29/24  0556 08/30/24  0517 08/31/24  0612   WBC 11.6* 9.3 9.6   HGB 11.7* 11.7* 12.6*   HCT 36.2* 34.6* 39.2*    213 274     Recent Labs     08/29/24  0556 08/30/24  0517 08/31/24  0612   * 134* 135*   K 5.1 4.2 4.5   CL 99 100 100   CO2 23 21 22   BUN 16 14 14   CREATININE 0.8 0.6* 0.8   CALCIUM 9.2 9.1 9.7     No results for input(s): \"AST\", \"ALT\", \"BILIDIR\", \"BILITOT\", \"ALKPHOS\" in the last 72 hours.  No results for input(s): \"INR\" in the last 72 hours.  No results for input(s): \"CKTOTAL\", \"TROPHS\" in the last 72 hours.    Urinalysis:      Lab Results   Component Value Date/Time    NITRU Negative 07/09/2024 11:49 AM    WBCUA 6-10 05/02/2010 07:26 PM    BACTERIA 1+ 05/02/2010 07:26 PM    RBCUA 15-20 05/02/2010 07:26 PM    BLOODU Negative 07/09/2024 11:49 AM    GLUCOSEU Negative 07/09/2024 11:49 AM    GLUCOSEU NEGATIVE 09/01/2011 10:13 PM       Radiology:  No orders to display       IP CONSULT TO HOSPITALIST    Assessment/Plan:    Active Hospital Problems    Diagnosis     Cancer of sigmoid colon (HCC) [C18.7]      IDDM 2  Increase Lantus 15 to 20 U bid  Increase Humalog 10 to 15 U with meals  Continue SSI  Hypoglycemia orders  A1C was 8.4% on 8/28  Afib  Resume anticoagulant  Continue Lopressor  CAD  Continue Metoprolol, Lisinopril, Lasix, Aldactone and Crestor  Colon cancer  S/P colon resection on 8/28  Morbid obesity  BMI is 44    DVT Prophylaxis: Lovenox  Diet: ADULT DIET; Regular; 4 carb choices (60

## 2024-08-31 NOTE — PROGRESS NOTES
Assessment complete at 2150.  VSS except elevated /74.  BS hypoactive, patient reports \"gurgling in stomach\" but no gas passed yet.  A/Ox4.  Denies pain at this time.  Diminished breath sounds w/ dyspnea on exertion.  Surgical scars well approximated and CDI.  Care plan discussed, patient mutually agrees.  Call light within reach, no further needs at this time.    0630: passed gas and had a bowel movement    Abby Llanos RN

## 2024-09-12 ENCOUNTER — TELEPHONE (OUTPATIENT)
Dept: SURGERY | Age: 74
End: 2024-09-12

## 2024-09-12 ENCOUNTER — OFFICE VISIT (OUTPATIENT)
Dept: SURGERY | Age: 74
End: 2024-09-12

## 2024-09-12 VITALS — WEIGHT: 305 LBS | BODY MASS INDEX: 42.54 KG/M2 | SYSTOLIC BLOOD PRESSURE: 119 MMHG | DIASTOLIC BLOOD PRESSURE: 60 MMHG

## 2024-09-12 DIAGNOSIS — C18.7 CANCER OF SIGMOID COLON (HCC): Primary | ICD-10-CM

## 2024-09-12 PROCEDURE — 99024 POSTOP FOLLOW-UP VISIT: CPT | Performed by: SURGERY

## 2024-09-12 RX ORDER — LEVOFLOXACIN 750 MG/1
750 TABLET, FILM COATED ORAL DAILY
Qty: 10 TABLET | Refills: 0 | Status: SHIPPED | OUTPATIENT
Start: 2024-09-12 | End: 2024-09-22

## 2024-09-13 ENCOUNTER — TELEPHONE (OUTPATIENT)
Dept: SURGERY | Age: 74
End: 2024-09-13

## 2024-09-24 ENCOUNTER — OFFICE VISIT (OUTPATIENT)
Dept: SURGERY | Age: 74
End: 2024-09-24

## 2024-09-24 VITALS — SYSTOLIC BLOOD PRESSURE: 119 MMHG | WEIGHT: 305 LBS | DIASTOLIC BLOOD PRESSURE: 60 MMHG | BODY MASS INDEX: 42.54 KG/M2

## 2024-09-24 DIAGNOSIS — C18.7 CANCER OF SIGMOID COLON (HCC): Primary | ICD-10-CM

## 2024-09-24 PROCEDURE — 99024 POSTOP FOLLOW-UP VISIT: CPT | Performed by: SURGERY

## 2024-09-25 RX ORDER — SPIRONOLACTONE 25 MG/1
25 TABLET ORAL DAILY
Qty: 90 TABLET | Refills: 0 | Status: SHIPPED | OUTPATIENT
Start: 2024-09-25

## 2024-09-27 NOTE — PROGRESS NOTES
[facility]      CARDIAC FOLLOW UP  [phone]  9/27/24  Referring: Arnold Hightower DO (PCP)    REASON FOR CONSULT/CHIEF COMPLAINT/HPI     Reason for visit/ Chief complaint  CAD s/p CABG   HPI Jason Kenney is a 74 y.o.  male who is here for scheduled re-evaluation of CAD and atrial fibrillaton.    His extensive medical history is as noted below and includes CAD w/ CABG 11/2022, dCHF, PHTN, HTN, HLD, PAF (RF flutter ablation 2015). He is a former smoker.    At last visit, I ordered a Lexiscan.  He didn't get this done, as he felt horrible with his last Lexiscan (which was done prior to his CABG).    He's been on eliquis since 2015.      He states he is addicted to food and does binge eat. Ventral hernia present \"forever\" per pt report.  He has h/o knee replacement. Last A1c 7.6 in 12/2023. He has noted some occasional hemorrhoidal bleeding a long time ago that became worse after being on Eliquis. He is unsure whether or not CT surgeon that did his CABG also did an JOHN clip.    7/9/24 - Presented to the ED with complaints of black/tarry stools for the previous 6 months and right lower abdominal pain that was described as dull and intermittent.  Also reported that he had been feeling generally fatigued.  Reported that he had recently had an EGD with Dr. Manuel that was unremarkable.  GI was consulted and did not recommend any acute intervention at that point and felt it was appropriate for patient to follow up on an outpatient basis.      8/1/24 - Established with Dr. Giles Gallardo MD for a surgical consult for sigmoid cancer noted by way of Colonoscopy on 7/15/24.  On 8/28/24 - Underwent lap sigmoid colon resection for cancer of sigmoid colon with Dr. Gallardo.      Today, Jason is here for a 6 month follow up.        Patient is adherent with medications and is tolerating them well without side effects     HISTORY/ALLERGIES/ROS     MedHx:  has a past medical history of Acute

## 2024-10-01 ENCOUNTER — OFFICE VISIT (OUTPATIENT)
Dept: CARDIOLOGY CLINIC | Age: 74
End: 2024-10-01
Payer: MEDICARE

## 2024-10-01 VITALS
HEART RATE: 76 BPM | HEIGHT: 71 IN | OXYGEN SATURATION: 95 % | BODY MASS INDEX: 42.8 KG/M2 | SYSTOLIC BLOOD PRESSURE: 115 MMHG | DIASTOLIC BLOOD PRESSURE: 60 MMHG | WEIGHT: 305.7 LBS

## 2024-10-01 DIAGNOSIS — I10 PRIMARY HYPERTENSION: ICD-10-CM

## 2024-10-01 DIAGNOSIS — E11.42 TYPE 2 DIABETES MELLITUS WITH DIABETIC POLYNEUROPATHY, WITH LONG-TERM CURRENT USE OF INSULIN (HCC): ICD-10-CM

## 2024-10-01 DIAGNOSIS — E78.2 MIXED HYPERLIPIDEMIA DUE TO TYPE 2 DIABETES MELLITUS (HCC): ICD-10-CM

## 2024-10-01 DIAGNOSIS — I25.2 OLD MI (MYOCARDIAL INFARCTION): ICD-10-CM

## 2024-10-01 DIAGNOSIS — Z98.890 S/P ABLATION OF ATRIAL FLUTTER: ICD-10-CM

## 2024-10-01 DIAGNOSIS — G47.33 OSA (OBSTRUCTIVE SLEEP APNEA): ICD-10-CM

## 2024-10-01 DIAGNOSIS — I50.42 CHRONIC COMBINED SYSTOLIC AND DIASTOLIC HEART FAILURE (HCC): ICD-10-CM

## 2024-10-01 DIAGNOSIS — Z86.79 S/P ABLATION OF ATRIAL FLUTTER: ICD-10-CM

## 2024-10-01 DIAGNOSIS — I48.0 PAF (PAROXYSMAL ATRIAL FIBRILLATION) (HCC): Primary | ICD-10-CM

## 2024-10-01 DIAGNOSIS — E66.01 MORBID OBESITY WITH BMI OF 40.0-44.9, ADULT: ICD-10-CM

## 2024-10-01 DIAGNOSIS — E11.69 MIXED HYPERLIPIDEMIA DUE TO TYPE 2 DIABETES MELLITUS (HCC): ICD-10-CM

## 2024-10-01 DIAGNOSIS — Z79.4 TYPE 2 DIABETES MELLITUS WITH DIABETIC POLYNEUROPATHY, WITH LONG-TERM CURRENT USE OF INSULIN (HCC): ICD-10-CM

## 2024-10-01 DIAGNOSIS — I25.10 CAD IN NATIVE ARTERY: ICD-10-CM

## 2024-10-01 DIAGNOSIS — I48.3 TYPICAL ATRIAL FLUTTER (HCC): ICD-10-CM

## 2024-10-01 DIAGNOSIS — Z95.1 S/P CABG (CORONARY ARTERY BYPASS GRAFT): ICD-10-CM

## 2024-10-01 DIAGNOSIS — I71.21 ANEURYSM OF ASCENDING AORTA WITHOUT RUPTURE (HCC): ICD-10-CM

## 2024-10-01 PROCEDURE — 99214 OFFICE O/P EST MOD 30 MIN: CPT | Performed by: INTERNAL MEDICINE

## 2024-10-01 PROCEDURE — 3078F DIAST BP <80 MM HG: CPT | Performed by: INTERNAL MEDICINE

## 2024-10-01 PROCEDURE — G2211 COMPLEX E/M VISIT ADD ON: HCPCS | Performed by: INTERNAL MEDICINE

## 2024-10-01 PROCEDURE — 1123F ACP DISCUSS/DSCN MKR DOCD: CPT | Performed by: INTERNAL MEDICINE

## 2024-10-01 PROCEDURE — 3052F HG A1C>EQUAL 8.0%<EQUAL 9.0%: CPT | Performed by: INTERNAL MEDICINE

## 2024-10-01 PROCEDURE — 3074F SYST BP LT 130 MM HG: CPT | Performed by: INTERNAL MEDICINE

## 2024-10-01 NOTE — PROGRESS NOTES
[facility]      CARDIAC FOLLOW UP  [phone]  10/1/24  Referring: Arnold Hightower DO (PCP)    REASON FOR CONSULT/CHIEF COMPLAINT/HPI     Reason for visit/ Chief complaint  CAD s/p CABG   HPI Jason Kenney is a 74 y.o.  male who is here for scheduled re-evaluation of CAD and atrial fibrillaton.    His extensive medical history is as noted below and includes CAD w/ CABG 11/2022, dCHF, PHTN, HTN, HLD, PAF (RF flutter ablation 2015). He is a former smoker.    He's been on eliquis since 2015.      Since last visit, he was diagnosed with cancer of the sigmoid colon and is s/p resection by Dr. Gallardo.    Today he is here with his wife. He is doing well. He is cancer free currently. He denies any CP, SOB, palpitations, or dizziness at this time.     He sees endocrinologist Friday. He was on Mounjaro but currently paused due to the cancer.     Patient is adherent with medications and is tolerating them well without side effects     HISTORY/ALLERGIES/ROS     MedHx:  has a past medical history of Acute rhinosinusitis, Anxiety, Arthritis, CAD in native artery, Chronic back pain, Depression, Diabetes mellitus (HCC), Hernia of unspecified site of abdominal cavity without mention of obstruction or gangrene, Hyperlipidemia, Hypertension, Hypothyroidism, Kidney stones, Movement disorder, EMMA (obstructive sleep apnea), Osteoarthritis, PAF (paroxysmal atrial fibrillation) (HCC), Pneumonia, Type II or unspecified type diabetes mellitus without mention of complication, not stated as uncontrolled, and Vitamin D deficiency.  SurgHx:  has a past surgical history that includes Total knee arthroplasty; Cystoscopy (04/16/2010); Lithotripsy; Ureter stent placement; joint replacement; joint replacement; hernia repair; Cystoscopy (03/21/2011); Urethra dilation (03/21/2011); Cholecystectomy; Eye surgery (Left, 10/01/2023); Upper gastrointestinal endoscopy (N/A, 6/19/2024); Esophagus dilation (6/19/2024); Colonoscopy

## 2024-10-01 NOTE — PATIENT INSTRUCTIONS
Follow up with Dr Kohler in 6 month     Speak to your surgeon about your incision.     Speak to Endocrinologist about your weight loss medication    Call for any questions or concerns.

## 2024-10-10 ENCOUNTER — OFFICE VISIT (OUTPATIENT)
Dept: ORTHOPEDIC SURGERY | Age: 74
End: 2024-10-10

## 2024-10-10 DIAGNOSIS — M12.561 TRAUMATIC ARTHROPATHY OF RIGHT KNEE: ICD-10-CM

## 2024-10-10 DIAGNOSIS — M12.562 TRAUMATIC ARTHROPATHY OF LEFT KNEE: Primary | ICD-10-CM

## 2024-10-10 NOTE — PROGRESS NOTES
Subjective:      Patient ID: Jason Kenney is a 74 y.o.  male.  Here for annual follow up visit.   S/P right and left knee arthroplasty.   The date of procedure-right total knee 2007, left total knee 2009.  [facility] claim.    Surgeon: Bryce  Issues or complaints: Mild stiffness, discomfort in left and right knee.    Recently underwent partial resection of his colon for colon cancer.      Review of Systems:  I have reviewed the clinically relevant past medical history, medications, allergies, family history, social history, and 13 point Review of Systems from the patient's recent history form & documented any details relevant to today's presenting complaints in the history above. The patient's self-reported past medical history, medications, allergies, family history, social history, and Review of Systems form from today's date have been scanned into the chart under the \"Media\" tab.       Past Medical History:   Diagnosis Date    Acute rhinosinusitis 11/08/2013    Anxiety     Arthritis     CAD in native artery 03/27/2024    Chronic back pain     Depression     Diabetes mellitus (HCC)     Hernia of unspecified site of abdominal cavity without mention of obstruction or gangrene     Hyperlipidemia     Hypertension     no hx HTN    Hypothyroidism     Kidney stones     Movement disorder     EMMA (obstructive sleep apnea) 03/27/2024    does not use cpap    Osteoarthritis     PAF (paroxysmal atrial fibrillation) (HCC) 03/27/2024    Pneumonia     Type II or unspecified type diabetes mellitus without mention of complication, not stated as uncontrolled     Vitamin D deficiency        Family History   Problem Relation Age of Onset    Cancer Father     Arthritis Other     Cancer Other     Diabetes Other     Heart Disease Other     High Blood Pressure Other        Past Surgical History:   Procedure Laterality Date    CHOLECYSTECTOMY      COLECTOMY N/A 8/28/2024    LAPAROSCOPIC SIGMOID COLON RESECTION performed by Sami

## 2024-10-15 ENCOUNTER — OFFICE VISIT (OUTPATIENT)
Dept: SURGERY | Age: 74
End: 2024-10-15

## 2024-10-15 VITALS — SYSTOLIC BLOOD PRESSURE: 115 MMHG | DIASTOLIC BLOOD PRESSURE: 60 MMHG | BODY MASS INDEX: 42.54 KG/M2 | WEIGHT: 305 LBS

## 2024-10-15 DIAGNOSIS — C18.7 CANCER OF SIGMOID COLON (HCC): Primary | ICD-10-CM

## 2024-10-15 PROCEDURE — 99024 POSTOP FOLLOW-UP VISIT: CPT | Performed by: SURGERY

## 2024-10-15 NOTE — PROGRESS NOTES
[facility] GENERAL AND LAPAROSCOPIC SURGERY          PATIENT NAME: Jason Kenney     TODAY'S DATE: 10/15/2024    SUBJECTIVE:    Pt had  soome GI upset with peanuts. OW feels good. Incision ok.     OBJECTIVE:  VITALS:  /60   Wt (!) 138.3 kg (305 lb)   BMI 42.54 kg/m²     CONSTITUTIONAL:  awake and alert  LUNGS:  clear to auscultation  ABDOMEN:  normal bowel sounds, soft, non-distended, non-tender, incisions healed, no infection     Data:      Radiology Review:  None      ASSESSMENT AND PLAN:  Incision healed  No further acute issues  Seeing Dr. August next week  Colonoscopy with Dr. Manuel in a year    Giles Gallardo MD

## 2024-10-30 ENCOUNTER — TELEPHONE (OUTPATIENT)
Dept: CARDIOLOGY CLINIC | Age: 74
End: 2024-10-30

## 2024-12-23 RX ORDER — SPIRONOLACTONE 25 MG/1
25 TABLET ORAL DAILY
Qty: 30 TABLET | Refills: 0 | Status: SHIPPED | OUTPATIENT
Start: 2024-12-23

## 2024-12-23 NOTE — TELEPHONE ENCOUNTER
Received refill request for Spironolactone from [facility].     Last OV: 10/1/2024 WAK    Next OV:  4/8/2025 WAK    Last Labs: 8/31/2024 BMP

## 2024-12-31 NOTE — TELEPHONE ENCOUNTER
Medication Refill    Medication needing refilled:   metoprolol tartrate (LOPRESSOR) 25 MG tablet   Dosage of the medication:     How are you taking this medication (QD, BID, TID, QID, PRN):     Taking 50 mg twice a day     30 or 90 day supply called in: 90    When will you run out of your medication:     Which Pharmacy are we sending the medication to?:  [facility] [id number] [place] [address] [address] [phone] - F [phone]

## 2024-12-31 NOTE — TELEPHONE ENCOUNTER
Received refill request for metoprolol from [facility] pharmacy.    Last ov: 10/01/2024 LISA    Last EK2024    Next appointment: 2025 LISA    Historical Provider     Spoke with patients wife she stated patient has been taking 50 mg BID

## 2025-01-02 RX ORDER — METOPROLOL TARTRATE 25 MG/1
TABLET, FILM COATED ORAL
Qty: 60 TABLET | Refills: 0 | Status: SHIPPED | OUTPATIENT
Start: 2025-01-02 | End: 2025-01-03

## 2025-01-03 DIAGNOSIS — I10 PRIMARY HYPERTENSION: ICD-10-CM

## 2025-01-03 DIAGNOSIS — I48.0 PAF (PAROXYSMAL ATRIAL FIBRILLATION) (HCC): Primary | ICD-10-CM

## 2025-01-03 RX ORDER — METOPROLOL TARTRATE 50 MG
50 TABLET ORAL 2 TIMES DAILY
Qty: 90 TABLET | Refills: 1 | Status: SHIPPED | OUTPATIENT
Start: 2025-01-03

## 2025-01-03 NOTE — TELEPHONE ENCOUNTER
Received fax from [facility] in regards to clarification on medication.     Metoprolol 25mg. Per notes patient is taking 50mg twice daily. Script sent was for Metoprolol 25mg- take 50mg twice daily but quantity is only 60 tabs for 30 days.     Please send updated script to pharmacy.

## 2025-01-20 RX ORDER — SPIRONOLACTONE 25 MG/1
25 TABLET ORAL DAILY
Qty: 90 TABLET | Refills: 3 | Status: SHIPPED | OUTPATIENT
Start: 2025-01-20

## 2025-01-20 NOTE — TELEPHONE ENCOUNTER
Received refill request for Spironolactone 25 MG from [facility] [address] [facility] [address] [facility].     Last OV: 10/01/24    Next OV: 04/08/25    Last Labs: BMP 08/31/24    Last Filled: 12/23/24

## 2025-03-24 NOTE — TELEPHONE ENCOUNTER
Last ov:10/01/24 WAK  Next ov:25 WAK  Last EK24  Last labs:24  Last filled:   Disp Refills Start End    apixaban (ELIQUIS) 2.5 MG TABS tablet 180 tablet 3 3/22/2024 --    Sig - Route: Take 1 tablet by mouth 2 times daily - Oral    Sent to pharmacy as: Apixaban 2.5 MG Oral Tablet (Eliquis)    Cosign for Ordering: Accepted by Michael Kohler MD on 3/22/2024  5:17 PM    E-Prescribing Status: Receipt confirmed by pharmacy (3/22/2024  2:58 PM EDT)

## 2025-03-25 NOTE — PROGRESS NOTES
[facility]      CARDIAC FOLLOW UP  [phone]  4/8/25  Referring: Arnold Hightower DO (PCP)    REASON FOR CONSULT/CHIEF COMPLAINT/HPI     Reason for visit/ Chief complaint  CAD s/p CABG   HPI Jason Kenney is a 74 y.o.  male who is here for follow up.      His extensive medical history is as noted below and includes CAD w/ CABG 11/2022, dCHF, PHTN, HTN, HLD, PAF (RF flutter ablation 2015). He is a former smoker.    He's been on eliquis since 2015.      He was diagnosed with cancer of the sigmoid colon and is s/p resection by Dr. Gallardo 8/28/24.    Today he is here with his wife. He is doing good. He denies any CP, SOB, palpitations or dizziness at this time. He is overweight and would like to take the weight loss medications but was told by his endocrinologist that he has to wait for a year post op sigmoid cancer resection.     Patient is adherent with medications and is tolerating them well without side effects     HISTORY/ALLERGIES/ROS     MedHx:  has a past medical history of Acute rhinosinusitis, Anxiety, Arthritis, CAD in native artery, Chronic back pain, Depression, Diabetes mellitus (HCC), Hernia of unspecified site of abdominal cavity without mention of obstruction or gangrene, Hyperlipidemia, Hypertension, Hypothyroidism, Kidney stones, Movement disorder, EMMA (obstructive sleep apnea), Osteoarthritis, PAF (paroxysmal atrial fibrillation) (HCC), Pneumonia, Type II or unspecified type diabetes mellitus without mention of complication, not stated as uncontrolled, and Vitamin D deficiency.  SurgHx:  has a past surgical history that includes Total knee arthroplasty; Cystoscopy (04/16/2010); Lithotripsy; Ureter stent placement; joint replacement; joint replacement; hernia repair; Cystoscopy (03/21/2011); Urethra dilation (03/21/2011); Cholecystectomy; Eye surgery (Left, 10/01/2023); Upper gastrointestinal endoscopy (N/A, 6/19/2024); Esophagus dilation (6/19/2024); Colonoscopy (N/A,

## 2025-04-01 DIAGNOSIS — I48.0 PAF (PAROXYSMAL ATRIAL FIBRILLATION) (HCC): ICD-10-CM

## 2025-04-01 DIAGNOSIS — I10 PRIMARY HYPERTENSION: ICD-10-CM

## 2025-04-01 RX ORDER — METOPROLOL TARTRATE 50 MG
50 TABLET ORAL 2 TIMES DAILY
Qty: 90 TABLET | Refills: 3 | Status: SHIPPED | OUTPATIENT
Start: 2025-04-01

## 2025-04-08 ENCOUNTER — OFFICE VISIT (OUTPATIENT)
Dept: CARDIOLOGY CLINIC | Age: 75
End: 2025-04-08
Payer: MEDICARE

## 2025-04-08 VITALS
OXYGEN SATURATION: 97 % | WEIGHT: 217 LBS | BODY MASS INDEX: 30.38 KG/M2 | SYSTOLIC BLOOD PRESSURE: 138 MMHG | DIASTOLIC BLOOD PRESSURE: 72 MMHG | HEIGHT: 71 IN | HEART RATE: 65 BPM

## 2025-04-08 DIAGNOSIS — I10 PRIMARY HYPERTENSION: ICD-10-CM

## 2025-04-08 DIAGNOSIS — E11.42 TYPE 2 DIABETES MELLITUS WITH DIABETIC POLYNEUROPATHY, WITH LONG-TERM CURRENT USE OF INSULIN (HCC): ICD-10-CM

## 2025-04-08 DIAGNOSIS — I50.42 CHRONIC COMBINED SYSTOLIC AND DIASTOLIC HEART FAILURE (HCC): ICD-10-CM

## 2025-04-08 DIAGNOSIS — E11.69 MIXED HYPERLIPIDEMIA DUE TO TYPE 2 DIABETES MELLITUS (HCC): ICD-10-CM

## 2025-04-08 DIAGNOSIS — Z98.890 S/P ABLATION OF ATRIAL FLUTTER: ICD-10-CM

## 2025-04-08 DIAGNOSIS — I25.10 CAD IN NATIVE ARTERY: ICD-10-CM

## 2025-04-08 DIAGNOSIS — I50.32 CHRONIC DIASTOLIC HEART FAILURE (HCC): ICD-10-CM

## 2025-04-08 DIAGNOSIS — I71.21 ANEURYSM OF ASCENDING AORTA WITHOUT RUPTURE: ICD-10-CM

## 2025-04-08 DIAGNOSIS — I48.3 TYPICAL ATRIAL FLUTTER (HCC): ICD-10-CM

## 2025-04-08 DIAGNOSIS — Z79.4 TYPE 2 DIABETES MELLITUS WITH DIABETIC POLYNEUROPATHY, WITH LONG-TERM CURRENT USE OF INSULIN (HCC): ICD-10-CM

## 2025-04-08 DIAGNOSIS — E66.01 MORBID OBESITY WITH BMI OF 40.0-44.9, ADULT: ICD-10-CM

## 2025-04-08 DIAGNOSIS — E78.2 MIXED HYPERLIPIDEMIA DUE TO TYPE 2 DIABETES MELLITUS (HCC): ICD-10-CM

## 2025-04-08 DIAGNOSIS — Z95.1 S/P CABG (CORONARY ARTERY BYPASS GRAFT): ICD-10-CM

## 2025-04-08 DIAGNOSIS — Z86.79 S/P ABLATION OF ATRIAL FLUTTER: ICD-10-CM

## 2025-04-08 DIAGNOSIS — G47.33 OSA (OBSTRUCTIVE SLEEP APNEA): ICD-10-CM

## 2025-04-08 DIAGNOSIS — I48.0 PAF (PAROXYSMAL ATRIAL FIBRILLATION) (HCC): Primary | ICD-10-CM

## 2025-04-08 DIAGNOSIS — I25.2 OLD MI (MYOCARDIAL INFARCTION): ICD-10-CM

## 2025-04-08 PROCEDURE — 93000 ELECTROCARDIOGRAM COMPLETE: CPT | Performed by: INTERNAL MEDICINE

## 2025-04-08 PROCEDURE — 1123F ACP DISCUSS/DSCN MKR DOCD: CPT | Performed by: INTERNAL MEDICINE

## 2025-04-08 PROCEDURE — 3075F SYST BP GE 130 - 139MM HG: CPT | Performed by: INTERNAL MEDICINE

## 2025-04-08 PROCEDURE — 3078F DIAST BP <80 MM HG: CPT | Performed by: INTERNAL MEDICINE

## 2025-04-08 PROCEDURE — G2211 COMPLEX E/M VISIT ADD ON: HCPCS | Performed by: INTERNAL MEDICINE

## 2025-04-08 PROCEDURE — 99214 OFFICE O/P EST MOD 30 MIN: CPT | Performed by: INTERNAL MEDICINE

## 2025-04-08 RX ORDER — METOPROLOL SUCCINATE 50 MG/1
75 TABLET, EXTENDED RELEASE ORAL DAILY
Qty: 135 TABLET | Refills: 3 | Status: SHIPPED | OUTPATIENT
Start: 2025-04-08

## 2025-04-08 RX ORDER — HYDROXYZINE PAMOATE 25 MG/1
CAPSULE ORAL
COMMUNITY
Start: 2025-03-11

## 2025-04-08 NOTE — PATIENT INSTRUCTIONS
Follow up with Dr Kohler in 6 months     Discontinue Metoprolol Tartrate     Start Metoprolol to XL 75mg daily     Call for any questions or concerns.

## 2025-04-10 ENCOUNTER — OFFICE VISIT (OUTPATIENT)
Dept: ORTHOPEDIC SURGERY | Age: 75
End: 2025-04-10

## 2025-04-10 VITALS — WEIGHT: 217 LBS | HEIGHT: 71 IN | BODY MASS INDEX: 30.38 KG/M2

## 2025-04-10 DIAGNOSIS — M12.561 TRAUMATIC ARTHROPATHY OF RIGHT KNEE: ICD-10-CM

## 2025-04-10 DIAGNOSIS — M12.562 TRAUMATIC ARTHROPATHY OF LEFT KNEE: Primary | ICD-10-CM

## 2025-04-10 NOTE — PROGRESS NOTES
Subjective:      Patient ID: Jason Kenney is a 74 y.o. male who is here for follow up evaluation of left and right knee arthroplasties.  This is a [facility] claim.  Posttraumatic arthritis.  Left total knee arthroplasty 2009, right total knee arthroplasty 2007.  Same issues, stiffness and discomfort.  No new issues or complaints with left or right knee.      Review Of Systems:   Constitutional: denies fever, chills, weight loss.  MSK: denies pain in other joints, muscle aches.  Neurological: denies numbness, tingling, weakness.       Past Medical History:   Diagnosis Date    Acute rhinosinusitis 11/08/2013    Anxiety     Arthritis     CAD in native artery 03/27/2024    Chronic back pain     Depression     Diabetes mellitus (HCC)     Hernia of unspecified site of abdominal cavity without mention of obstruction or gangrene     Hyperlipidemia     Hypertension     no hx HTN    Hypothyroidism     Kidney stones     Movement disorder     EMMA (obstructive sleep apnea) 03/27/2024    does not use cpap    Osteoarthritis     PAF (paroxysmal atrial fibrillation) (HCC) 03/27/2024    Pneumonia     Type II or unspecified type diabetes mellitus without mention of complication, not stated as uncontrolled     Vitamin D deficiency        Family History   Problem Relation Age of Onset    Cancer Father     Arthritis Other     Cancer Other     Diabetes Other     Heart Disease Other     High Blood Pressure Other        Past Surgical History:   Procedure Laterality Date    CHOLECYSTECTOMY      COLECTOMY N/A 8/28/2024    LAPAROSCOPIC SIGMOID COLON RESECTION performed by Giles Gallardo MD at [facility] OR    COLONOSCOPY N/A 7/15/2024    COLONOSCOPY BIOPSY performed by Sushil Manuel MD at [facility] ENDOSCOPY    COLONOSCOPY  7/15/2024    COLONOSCOPY SUBMUCOSAL SPOT INJECTION performed by Sushil Manuel MD at [facility] ENDOSCOPY    COLONOSCOPY  7/15/2024    COLONOSCOPY POLYPECTOMY SNARE/BIOPSY performed by Sushil Manuel MD at [facility]

## 2025-04-30 RX ORDER — ROSUVASTATIN CALCIUM 20 MG/1
20 TABLET, COATED ORAL DAILY
Qty: 30 TABLET | Refills: 11 | Status: SHIPPED | OUTPATIENT
Start: 2025-04-30

## 2025-04-30 NOTE — TELEPHONE ENCOUNTER
Received refill request for Crestor from [facility] pharmacy.     Last OV: 4/8/2025 WAK     Next OV: 10/9/2025 WAK     Last Labs: 2/11/2025 Lipid, 2/18/2025 CMP (In Care Everywhere)

## 2025-04-30 NOTE — TELEPHONE ENCOUNTER
Medication Refill    Medication needing refilled:  rosuvastatin     Dosage of the medication: 20mg    How are you taking this medication (QD, BID, TID, QID, PRN):   Take 1 tablet by mouth daily     30 or 90 day supply called in: 90    When will you run out of your medication:    Which Pharmacy are we sending the medication to?:     [facility] [id number]  [address] [place] [address]  Phone: [phone]  Fax: [phone]

## 2025-05-22 ENCOUNTER — TELEPHONE (OUTPATIENT)
Dept: CARDIOLOGY CLINIC | Age: 75
End: 2025-05-22

## 2025-05-22 DIAGNOSIS — I50.42 CHRONIC COMBINED SYSTOLIC AND DIASTOLIC HEART FAILURE (HCC): Primary | ICD-10-CM

## 2025-05-22 NOTE — TELEPHONE ENCOUNTER
Wife states pt's blood sugar has been bottoming out 42-49. Wife states pt was taking spironolactone, jardiance, monjuro and insulin. States insulin has been decreased and pt is stopping jardiance all together. States pt is decreasing lasix to 0.5 tab. Wife states pt has been sweating profusely and asking if WAK would be willing to place a lab order for potassium level and to see if pt is dehydrated.  States if pt comes back dehydrated she will take pt to ED for IV fluid.    Please call to advise when orders are in epic.

## 2025-05-23 ENCOUNTER — HOSPITAL ENCOUNTER (OUTPATIENT)
Age: 75
Discharge: HOME | End: 2025-05-23

## 2025-05-23 DIAGNOSIS — I50.42 CHRONIC COMBINED SYSTOLIC AND DIASTOLIC HEART FAILURE (HCC): ICD-10-CM

## 2025-05-23 LAB
ANION GAP SERPL CALCULATED.3IONS-SCNC: 12 MMOL/L (ref 3–16)
BUN SERPL-MCNC: 21 MG/DL (ref 7–20)
CALCIUM SERPL-MCNC: 10.4 MG/DL (ref 8.3–10.6)
CHLORIDE SERPL-SCNC: 98 MMOL/L (ref 99–110)
CO2 SERPL-SCNC: 24 MMOL/L (ref 21–32)
CREAT SERPL-MCNC: 0.8 MG/DL (ref 0.8–1.3)
GFR SERPLBLD CREATININE-BSD FMLA CKD-EPI: >90 ML/MIN/{1.73_M2}
GLUCOSE SERPL-MCNC: 110 MG/DL (ref 70–99)
NT-PROBNP SERPL-MCNC: 203 PG/ML (ref 0–124)
POTASSIUM SERPL-SCNC: 4.5 MMOL/L (ref 3.5–5.1)
SODIUM SERPL-SCNC: 134 MMOL/L (ref 136–145)

## 2025-05-23 NOTE — TELEPHONE ENCOUNTER
Spoke to wife and advised of labs in system. Pt will go now to have them done. I did advise that we will call if we receive results by end of day and if she does not hear she can call the oncall doctor if needed, otherwise ED if she feels his symptoms warrant it. Wife verbalized understanding and was agreeable to plan.

## 2025-05-24 ENCOUNTER — APPOINTMENT (OUTPATIENT)
Dept: CT IMAGING | Age: 75
End: 2025-05-24
Payer: MEDICARE

## 2025-05-24 ENCOUNTER — HOSPITAL ENCOUNTER (EMERGENCY)
Age: 75
Discharge: HOME | End: 2025-05-24
Payer: MEDICARE

## 2025-05-24 VITALS
TEMPERATURE: 98.6 F | DIASTOLIC BLOOD PRESSURE: 80 MMHG | BODY MASS INDEX: 42.7 KG/M2 | RESPIRATION RATE: 18 BRPM | SYSTOLIC BLOOD PRESSURE: 151 MMHG | HEART RATE: 81 BPM | WEIGHT: 305 LBS | OXYGEN SATURATION: 93 % | HEIGHT: 71 IN

## 2025-05-24 DIAGNOSIS — M54.50 ACUTE RIGHT-SIDED LOW BACK PAIN WITHOUT SCIATICA: Primary | ICD-10-CM

## 2025-05-24 LAB
ALBUMIN SERPL-MCNC: 4.5 G/DL (ref 3.4–5)
ALBUMIN/GLOB SERPL: 1.3 {RATIO} (ref 1.1–2.2)
ALP SERPL-CCNC: 64 U/L (ref 40–129)
ALT SERPL-CCNC: 14 U/L (ref 10–40)
ANION GAP SERPL CALCULATED.3IONS-SCNC: 13 MMOL/L (ref 3–16)
AST SERPL-CCNC: 17 U/L (ref 15–37)
BASOPHILS # BLD: 0.1 K/UL (ref 0–0.2)
BASOPHILS NFR BLD: 0.7 %
BILIRUB SERPL-MCNC: 1.3 MG/DL (ref 0–1)
BILIRUB UR QL STRIP.AUTO: NEGATIVE
BUN SERPL-MCNC: 21 MG/DL (ref 7–20)
CALCIUM SERPL-MCNC: 10.3 MG/DL (ref 8.3–10.6)
CHLORIDE SERPL-SCNC: 98 MMOL/L (ref 99–110)
CLARITY UR: CLEAR
CO2 SERPL-SCNC: 24 MMOL/L (ref 21–32)
COLOR UR: YELLOW
CREAT SERPL-MCNC: 0.8 MG/DL (ref 0.8–1.3)
DEPRECATED RDW RBC AUTO: 13.8 % (ref 12.4–15.4)
EOSINOPHIL # BLD: 0.7 K/UL (ref 0–0.6)
EOSINOPHIL NFR BLD: 8 %
GFR SERPLBLD CREATININE-BSD FMLA CKD-EPI: >90 ML/MIN/{1.73_M2}
GLUCOSE SERPL-MCNC: 102 MG/DL (ref 70–99)
GLUCOSE UR STRIP.AUTO-MCNC: NEGATIVE MG/DL
HCT VFR BLD AUTO: 43.7 % (ref 40.5–52.5)
HGB BLD-MCNC: 14.6 G/DL (ref 13.5–17.5)
HGB UR QL STRIP.AUTO: NEGATIVE
KETONES UR STRIP.AUTO-MCNC: NEGATIVE MG/DL
LEUKOCYTE ESTERASE UR QL STRIP.AUTO: NEGATIVE
LYMPHOCYTES # BLD: 2.1 K/UL (ref 1–5.1)
LYMPHOCYTES NFR BLD: 23.3 %
MCH RBC QN AUTO: 27.3 PG (ref 26–34)
MCHC RBC AUTO-ENTMCNC: 33.4 G/DL (ref 31–36)
MCV RBC AUTO: 81.7 FL (ref 80–100)
MONOCYTES # BLD: 0.7 K/UL (ref 0–1.3)
MONOCYTES NFR BLD: 8 %
NEUTROPHILS # BLD: 5.5 K/UL (ref 1.7–7.7)
NEUTROPHILS NFR BLD: 60 %
NITRITE UR QL STRIP.AUTO: NEGATIVE
PH UR STRIP.AUTO: 6 [PH] (ref 5–8)
PLATELET # BLD AUTO: 254 K/UL (ref 135–450)
PMV BLD AUTO: 7.7 FL (ref 5–10.5)
POTASSIUM SERPL-SCNC: 4.8 MMOL/L (ref 3.5–5.1)
PROT SERPL-MCNC: 7.9 G/DL (ref 6.4–8.2)
PROT UR STRIP.AUTO-MCNC: NEGATIVE MG/DL
RBC # BLD AUTO: 5.35 M/UL (ref 4.2–5.9)
SODIUM SERPL-SCNC: 135 MMOL/L (ref 136–145)
SP GR UR STRIP.AUTO: <=1.005 (ref 1–1.03)
UA COMPLETE W REFLEX CULTURE PNL UR: NORMAL
UA DIPSTICK W REFLEX MICRO PNL UR: NORMAL
URN SPEC COLLECT METH UR: NORMAL
UROBILINOGEN UR STRIP-ACNC: 0.2 E.U./DL
WBC # BLD AUTO: 9.2 K/UL (ref 4–11)

## 2025-05-24 PROCEDURE — 74176 CT ABD & PELVIS W/O CONTRAST: CPT

## 2025-05-24 PROCEDURE — 81003 URINALYSIS AUTO W/O SCOPE: CPT

## 2025-05-24 PROCEDURE — 6370000000 HC RX 637 (ALT 250 FOR IP): Performed by: PHYSICIAN ASSISTANT

## 2025-05-24 PROCEDURE — 6360000002 HC RX W HCPCS: Performed by: PHYSICIAN ASSISTANT

## 2025-05-24 PROCEDURE — 96374 THER/PROPH/DIAG INJ IV PUSH: CPT

## 2025-05-24 PROCEDURE — 99284 EMERGENCY DEPT VISIT MOD MDM: CPT

## 2025-05-24 PROCEDURE — 80053 COMPREHEN METABOLIC PANEL: CPT

## 2025-05-24 PROCEDURE — 85025 COMPLETE CBC W/AUTO DIFF WBC: CPT

## 2025-05-24 RX ORDER — KETOROLAC TROMETHAMINE 15 MG/ML
15 INJECTION, SOLUTION INTRAMUSCULAR; INTRAVENOUS ONCE
Status: COMPLETED | OUTPATIENT
Start: 2025-05-24 | End: 2025-05-24

## 2025-05-24 RX ORDER — OXYCODONE AND ACETAMINOPHEN 5; 325 MG/1; MG/1
1 TABLET ORAL EVERY 6 HOURS PRN
Qty: 8 TABLET | Refills: 0 | Status: SHIPPED | OUTPATIENT
Start: 2025-05-24 | End: 2025-05-27

## 2025-05-24 RX ORDER — LIDOCAINE 4 G/G
1 PATCH TOPICAL ONCE
Status: DISCONTINUED | OUTPATIENT
Start: 2025-05-24 | End: 2025-05-24 | Stop reason: HOSPADM

## 2025-05-24 RX ORDER — LIDOCAINE 50 MG/G
1 PATCH TOPICAL DAILY
Qty: 30 PATCH | Refills: 0 | Status: SHIPPED | OUTPATIENT
Start: 2025-05-24

## 2025-05-24 RX ORDER — OXYCODONE AND ACETAMINOPHEN 5; 325 MG/1; MG/1
1 TABLET ORAL ONCE
Refills: 0 | Status: COMPLETED | OUTPATIENT
Start: 2025-05-24 | End: 2025-05-24

## 2025-05-24 RX ORDER — DOCUSATE SODIUM 100 MG/1
100 CAPSULE, LIQUID FILLED ORAL 2 TIMES DAILY PRN
Qty: 30 CAPSULE | Refills: 0 | Status: SHIPPED | OUTPATIENT
Start: 2025-05-24

## 2025-05-24 RX ADMIN — KETOROLAC TROMETHAMINE 15 MG: 15 INJECTION, SOLUTION INTRAMUSCULAR; INTRAVENOUS at 14:09

## 2025-05-24 RX ADMIN — OXYCODONE AND ACETAMINOPHEN 1 TABLET: 325; 5 TABLET ORAL at 14:08

## 2025-05-24 ASSESSMENT — PAIN DESCRIPTION - DESCRIPTORS: DESCRIPTORS: DISCOMFORT

## 2025-05-24 ASSESSMENT — PAIN DESCRIPTION - LOCATION: LOCATION: BACK

## 2025-05-24 ASSESSMENT — PAIN DESCRIPTION - ORIENTATION: ORIENTATION: RIGHT

## 2025-05-24 ASSESSMENT — PAIN - FUNCTIONAL ASSESSMENT: PAIN_FUNCTIONAL_ASSESSMENT: 0-10

## 2025-05-24 ASSESSMENT — PAIN SCALES - GENERAL: PAINLEVEL_OUTOF10: 3

## 2025-05-24 NOTE — ED PROVIDER NOTES
[facility] EMERGENCY DEPARTMENT  EMERGENCY DEPARTMENT ENCOUNTER        Pt Name: Jason Kenney  MRN: [id number]  Birthdate 1950  Date of evaluation: 5/24/2025  Provider: Tunde Guaman PA-C  PCP: Arnold Cr DO  Note Started: 5:28 PM EDT 5/24/25      MARLENI. I have evaluated this patient.        CHIEF COMPLAINT       Chief Complaint   Patient presents with    Back Pain     Right sided. States cannot get around at home due to pain .        HISTORY OF PRESENT ILLNESS: 1 or more Elements     History From: patient  Limitations to history : None    Jason Kenney is a 74 y.o. male who presents to the emergency department with a chief complaint of worsening right low back pain.  Patient began to have some pain about a week ago that got worse today.  He states this is slightly worse after he urinated and did notice some possible pink to dark urine a week ago.  Denies dysuria or hematuria since then.  Denies diarrhea or bloody stool or loss of bowel or bladder function.  Denies any pain or numbness in his extremities.  Denies any fall or direct injury or trauma.  Denies abdominal pain, nausea, vomiting, fevers.  Does have history of diabetes.  Denies any other symptoms.    Nursing Notes were all reviewed and agreed with or any disagreements were addressed in the HPI.    REVIEW OF SYSTEMS :      Review of Systems    Positives and Pertinent negatives as per HPI.     SURGICAL HISTORY     Past Surgical History:   Procedure Laterality Date    CHOLECYSTECTOMY      COLECTOMY N/A 8/28/2024    LAPAROSCOPIC SIGMOID COLON RESECTION performed by Giles Gallardo MD at [facility] OR    COLONOSCOPY N/A 7/15/2024    COLONOSCOPY BIOPSY performed by Sushil Manuel MD at [facility] ENDOSCOPY    COLONOSCOPY  7/15/2024    COLONOSCOPY SUBMUCOSAL SPOT INJECTION performed by Sushil Manuel MD at [facility] ENDOSCOPY    COLONOSCOPY  7/15/2024    COLONOSCOPY POLYPECTOMY SNARE/BIOPSY performed by Sushil Manuel MD at  none     Procedures      CRITICAL CARE TIME (.cctime)       PAST MEDICAL HISTORY      has a past medical history of Acute rhinosinusitis (11/08/2013), Anxiety, Arthritis, CAD in native artery (03/27/2024), Chronic back pain, Depression, Diabetes mellitus (HCC), Hernia of unspecified site of abdominal cavity without mention of obstruction or gangrene, Hyperlipidemia, Hypertension, Hypothyroidism, Kidney stones, Movement disorder, EMMA (obstructive sleep apnea) (03/27/2024), Osteoarthritis, PAF (paroxysmal atrial fibrillation) (HCC) (03/27/2024), Pneumonia, Type II or unspecified type diabetes mellitus without mention of complication, not stated as uncontrolled, and Vitamin D deficiency.     EMERGENCY DEPARTMENT COURSE and DIFFERENTIAL DIAGNOSIS/MDM:   Vitals:    Vitals:    05/24/25 1322   BP: (!) 151/80   Pulse: 81   Resp: 18   Temp: 98.6 °F (37 °C)   TempSrc: Oral   SpO2: 93%   Weight: (!) 138.3 kg (305 lb)   Height: 1.803 m (5' 11\")       Is this patient to be included in the SEP-1 Core Measure due to severe sepsis or septic shock?   No   Exclusion criteria - the patient is NOT to be included for SEP-1 Core Measure due to:  Infection is not suspected    Patient was given the following medications:  Medications   lidocaine 4 % external patch 1 patch (1 patch TransDERmal Patch Applied 5/24/25 1409)   ketorolac (TORADOL) injection 15 mg (15 mg IntraVENous Given 5/24/25 1409)   oxyCODONE-acetaminophen (PERCOCET) 5-325 MG per tablet 1 tablet (1 tablet Oral Given 5/24/25 1408)             Chronic Conditions affecting care:    has a past medical history of Acute rhinosinusitis (11/08/2013), Anxiety, Arthritis, CAD in native artery (03/27/2024), Chronic back pain, Depression, Diabetes mellitus (HCC), Hernia of unspecified site of abdominal cavity without mention of obstruction or gangrene, Hyperlipidemia, Hypertension, Hypothyroidism, Kidney stones, Movement disorder, EMMA (obstructive sleep apnea) (03/27/2024),

## 2025-06-03 NOTE — TELEPHONE ENCOUNTER
Requested Prescriptions     Pending Prescriptions Disp Refills    lisinopril (PRINIVIL;ZESTRIL) 2.5 MG tablet 90 tablet 3     Sig: Oral for 30 Days      Last OV:  1/10/2024 LISA    Next OV: 10/09/2025 LISA    Last Labs: 05/23/2025 BMP    Last Filled: 06/07/2024 LISA

## 2025-06-08 ENCOUNTER — RESULTS FOLLOW-UP (OUTPATIENT)
Dept: CARDIOLOGY CLINIC | Age: 75
End: 2025-06-08

## 2025-06-08 RX ORDER — LISINOPRIL 2.5 MG/1
TABLET ORAL
Qty: 90 TABLET | Refills: 3 | Status: SHIPPED | OUTPATIENT
Start: 2025-06-08

## 2025-06-09 ENCOUNTER — TELEPHONE (OUTPATIENT)
Age: 75
End: 2025-06-09

## 2025-06-09 RX ORDER — LISINOPRIL 2.5 MG/1
2.5 TABLET ORAL DAILY
Qty: 90 TABLET | Refills: 3 | Status: SHIPPED | OUTPATIENT
Start: 2025-06-09

## 2025-06-09 NOTE — TELEPHONE ENCOUNTER
Received medication clarification from pharmacy for Lisinopril 2.5mg three times daily. Per WAK, pt only needs to take the medication once a day. Need to clarify the total mg's he is taking and send in new script for the total dose once daily. NA/LVM

## 2025-06-09 NOTE — TELEPHONE ENCOUNTER
----- Message from Dr. Michael Kohler MD sent at 6/8/2025 11:54 AM EDT -----  Labs stable  Continue current medications.     Kosher

## (undated) DEVICE — SOLUTION IRRIG 1000ML 0.9% SOD CHL USP POUR PLAS BTL

## (undated) DEVICE — ELECTRODE ELECSURG NDL 2.8 INX7.2 CM COAT INSUL EDGE

## (undated) DEVICE — STAPLER INT DIA29MM CLS STPL H1.5-2.2MM OPN LEG L5.2MM 26

## (undated) DEVICE — 30978 SEE SHARP - ENHANCED INTRAOPERATIVE LAPAROSCOPE CLEANING & DEFOGGING: Brand: 30978 SEE SHARP - ENHANCED INTRAOPERATIVE LAPAROSCOPE CLEANING & DEFOGGING

## (undated) DEVICE — STAPLER 60MM POWERED ECHELON 3000  SHORT 340MM

## (undated) DEVICE — LAPAROSCOPIC ACCESS SYSTEM: Brand: ALEXIS LAPAROSCOPIC SYSTEM WITH KII FIOS FIRST ENTRY

## (undated) DEVICE — BW-412T DISP COMBO CLEANING BRUSH: Brand: SINGLE USE COMBINATION CLEANING BRUSH

## (undated) DEVICE — PROCEDURE KIT ENDOSCP CUST

## (undated) DEVICE — TROCAR SLEEVE: Brand: KII ® LOW PROFILE SLEEVE

## (undated) DEVICE — GOWN SIRUS NONREIN LG W/TWL: Brand: MEDLINE INDUSTRIES, INC.

## (undated) DEVICE — Device

## (undated) DEVICE — TOWEL,OR,DSP,ST,BLUE,DLX,10/PK,8PK/CS: Brand: MEDLINE

## (undated) DEVICE — 1LYRTR 16FR10ML 100%SILI SNAP: Brand: MEDLINE INDUSTRIES, INC.

## (undated) DEVICE — TRAP SPEC RETRV CLR PLAS POLYP IN LN SUCT QUIK CTCH

## (undated) DEVICE — FORCEPS BX L240CM WRK CHN 2.8MM STD CAP W/ NDL MIC MESH

## (undated) DEVICE — SUTURE PERMA-HAND SZ 2-0 L30IN NONABSORBABLE BLK L26MM SH K833H

## (undated) DEVICE — WET SKIN PREP TRAY: Brand: MEDLINE INDUSTRIES, INC.

## (undated) DEVICE — RELOAD STPL H1.8-3.8MM REG THCK TISS G 6 ROW GRIPPING SURF

## (undated) DEVICE — CORD ES L10FT MPLR LAP

## (undated) DEVICE — HYPODERMIC SAFETY NEEDLE: Brand: MAGELLAN

## (undated) DEVICE — SINGLE USE AIR/WATER, SUCTION AND BIOPSY VALVES SET: Brand: ORCAPOD™

## (undated) DEVICE — MAJOR SET UP: Brand: MEDLINE INDUSTRIES, INC.

## (undated) DEVICE — LAPAROSCOPIC ACCESS SYSTEM: Brand: ALEXIS LAPAROSCOPIC SYSTEM

## (undated) DEVICE — STAPLER EXT 65MM S STL AUTO DISP PURSTRING

## (undated) DEVICE — SUTURE PERMAHAND SZ 3-0 L30IN NONABSORBABLE BLK SH L26MM K832H

## (undated) DEVICE — SUTURE ABSORBABLE MONOFILAMENT 0 CTX 60 IN VIO PDS + PDP990G

## (undated) DEVICE — SOLUTION IV IRRIG WATER 500ML POUR BRL ST 2F7113

## (undated) DEVICE — PAD TABLE RAMPED 1 IN TO 2 IN TRENDELENBURG

## (undated) DEVICE — DRAPE THER FLUID WARMING 66X44 IN FLAT SLUSH DBL DISC ORS

## (undated) DEVICE — AIR/WATER CLEANING ADAPTER FOR OLYMPUS® GI ENDOSCOPE: Brand: BULLDOG®

## (undated) DEVICE — Device: Brand: SPOT EX ENDOSCOPIC TATTOO

## (undated) DEVICE — GLOVE SURG SZ 6.5 L11.2IN FNGR THK9.8MIL STRW LTX POLYMER

## (undated) DEVICE — ENDOSCOPIC KIT 6X3/16 FT COLON W/ 1.1 OZ 2 GWN W/O BRSH

## (undated) DEVICE — SEALER LAP L37CM MARYLAND JAW OPN NANO COAT MULTIFUNCTIONAL

## (undated) DEVICE — CONTAINER,SPECIMEN,OR STERILE,4OZ: Brand: MEDLINE

## (undated) DEVICE — APPLICATOR MEDICATED 26 CC SOLUTION HI LT ORNG CHLORAPREP

## (undated) DEVICE — SYRINGE, LUER LOCK, 10ML: Brand: MEDLINE

## (undated) DEVICE — MOUTHPIECE ENDOSCP L CTRL OPN AND SIDE PORTS DISP

## (undated) DEVICE — SUTURE MONOCRYL + SZ 4-0 L27IN ABSRB UD L19MM PS-2 3/8 CIR MCP426H

## (undated) DEVICE — SUTURE PERMAHAND SZ 3-0 L18IN NONABSORBABLE BLK L26MM SH C013D

## (undated) DEVICE — LEGGINGS, PAIR, 31X48, STERILE: Brand: MEDLINE

## (undated) DEVICE — LAPAROSCOPIC TROCAR SLEEVE/SINGLE USE: Brand: KII® LOW PROFILE OPTICAL ACCESS SYSTEM

## (undated) DEVICE — PLUMEPORT ACTIV LAPAROSCOPIC SMOKE FILTRATION DEVICE: Brand: PLUMEPORT ACTIVE

## (undated) DEVICE — SHEET,DRAPE,40X58,STERILE: Brand: MEDLINE

## (undated) DEVICE — NEEDLE INJ 25GA P5MM SHFT L230CM SHTH DIA2.5MM S STL TEF

## (undated) DEVICE — SOLUTION IRRIG 500ML STRL H2O NONPYROGENIC

## (undated) DEVICE — PMI DISPOSABLE PUNCTURE CLOSURE DEVICE / SUTURE GRASPER: Brand: PMI

## (undated) DEVICE — PENCIL SMK EVAC TELSCP 3 M TBNG

## (undated) DEVICE — STAPLER INT L34CM 60MM LNG ENDOSCP ARTC PWR + ECHELON FLX

## (undated) DEVICE — BLANKET WRM W29.9XL79.1IN UP BODY FORC AIR MISTRAL-AIR

## (undated) DEVICE — [HIGH FLOW INSUFFLATOR,  DO NOT USE IF PACKAGE IS DAMAGED,  KEEP DRY,  KEEP AWAY FROM SUNLIGHT,  PROTECT FROM HEAT AND RADIOACTIVE SOURCES.]: Brand: PNEUMOSURE

## (undated) DEVICE — SNARE COLD DIAMOND 15MM THIN

## (undated) DEVICE — LIQUIBAND RAPID ADHESIVE 36/CS 0.8ML: Brand: MEDLINE

## (undated) DEVICE — INTENDED FOR TISSUE SEPARATION, AND OTHER PROCEDURES THAT REQUIRE A SHARP SURGICAL BLADE TO PUNCTURE OR CUT.: Brand: BARD-PARKER ® STAINLESS STEEL BLADES

## (undated) DEVICE — SUTURE VICRYL SZ 3-0 L18IN ABSRB UD L26MM SH 1/2 CIR J864D

## (undated) DEVICE — SUTURE VICRYL + SZ 0 L27IN ABSRB UD CT-1 L36MM 1/2 CIR TAPR VCP260H

## (undated) DEVICE — SPONGE LAP W18XL18IN WHT COT 4 PLY FLD STRUNG RADPQ DISP ST 2 PER PACK